# Patient Record
Sex: FEMALE | Race: BLACK OR AFRICAN AMERICAN | NOT HISPANIC OR LATINO | Employment: OTHER | URBAN - METROPOLITAN AREA
[De-identification: names, ages, dates, MRNs, and addresses within clinical notes are randomized per-mention and may not be internally consistent; named-entity substitution may affect disease eponyms.]

---

## 2022-04-11 ENCOUNTER — HOSPITAL ENCOUNTER (EMERGENCY)
Facility: OTHER | Age: 67
Discharge: HOME | End: 2022-04-11
Attending: EMERGENCY MEDICINE
Payer: MEDICARE

## 2022-04-11 VITALS
TEMPERATURE: 98 F | WEIGHT: 220 LBS | DIASTOLIC BLOOD PRESSURE: 63 MMHG | HEIGHT: 66 IN | HEART RATE: 49 BPM | RESPIRATION RATE: 17 BRPM | BODY MASS INDEX: 35.36 KG/M2 | SYSTOLIC BLOOD PRESSURE: 133 MMHG | OXYGEN SATURATION: 100 %

## 2022-04-11 DIAGNOSIS — M15.9 OSTEOARTHRITIS OF MULTIPLE JOINTS, UNSPECIFIED OSTEOARTHRITIS TYPE: Primary | ICD-10-CM

## 2022-04-11 PROCEDURE — 99283 EMERGENCY DEPT VISIT LOW MDM: CPT

## 2022-04-11 RX ORDER — DICLOFENAC SODIUM 10 MG/G
2 GEL TOPICAL DAILY
Qty: 20 G | Refills: 0 | Status: SHIPPED | OUTPATIENT
Start: 2022-04-11 | End: 2023-06-16

## 2022-04-12 NOTE — ED PROVIDER NOTES
Encounter Date: 4/11/2022       History     Chief Complaint   Patient presents with    Knee Pain     Right knee pain and right foot pain. Daughter states the patient walked more than usual yesterday when she got lost walking around. Nadn in triage.      65 y/o female presents with bilateral knee pain. Pt states pain started yesterday and describes it as an achy pain. Pt normally rides her bike daily but walked yesterday and that's when the knee started hurting. Pt has not tried any treatment for symptoms. Nothing specifically alleviates or worsens pain. This is a new occurrence although reports that she has similar ones with extended use in the past. Pt denies warmth, swelling, or erythema to the joints. Denies fever, chills, CP, or SOB. Pt is also having pain on her 5th toe.  Denies any numbness, tingling or inability to bear weight.    The history is provided by the patient and a relative.     Review of patient's allergies indicates:  No Known Allergies  No past medical history on file.  No past surgical history on file.  No family history on file.     Review of Systems   Constitutional: Negative for activity change, appetite change, chills and fever.   Respiratory: Negative for shortness of breath.    Cardiovascular: Negative for chest pain.   Gastrointestinal: Negative for constipation, diarrhea, nausea and vomiting.   Musculoskeletal: Positive for arthralgias and joint swelling. Negative for back pain and gait problem.   Skin: Negative for color change, rash and wound.   Neurological: Negative for dizziness, weakness, light-headedness and numbness.       Physical Exam     Initial Vitals [04/11/22 1805]   BP Pulse Resp Temp SpO2   133/63 (!) 49 17 97.9 °F (36.6 °C) 100 %      MAP       --         Physical Exam    Nursing note and vitals reviewed.  Constitutional: She appears well-developed and well-nourished. No distress.   Cardiovascular: Normal rate.   Pulmonary/Chest: No respiratory distress.    Musculoskeletal:         General: No tenderness. Normal range of motion.      Right upper leg: No tenderness.      Left upper leg: No tenderness.      Right knee: Effusion (slight) and crepitus present. No swelling or bony tenderness. Normal range of motion. No patellar tendon tenderness. No LCL laxity, MCL laxity, ACL laxity or PCL laxity.      Instability Tests: Anterior drawer test negative. Posterior drawer test negative. Medial Franny test negative and lateral Franny test negative.      Left knee: Effusion (slight) and crepitus present. No swelling or bony tenderness. Normal range of motion. No patellar tendon tenderness. No LCL laxity, MCL laxity, ACL laxity or PCL laxity.     Instability Tests: Anterior drawer test negative. Posterior drawer test negative. Medial Franny test negative and lateral Franny test negative.        Legs:         Feet:       Comments: 5/5 graded strength bilaterally for knee extension/flexion. Normal ROM for ankle and toe mobility.  Crepitus of the right affected knee with passive range of motion.  Chronic or sore throat a changes noted to bilateral knees along the medial joint line.  No laxity appreciated.  No acute bony process noted.     Neurological: She is alert and oriented to person, place, and time. She has normal strength. No sensory deficit. GCS score is 15. GCS eye subscore is 4. GCS verbal subscore is 5. GCS motor subscore is 6.         ED Course   Procedures  Labs Reviewed - No data to display       Imaging Results    None          Medications - No data to display    Tyra Zavala 66 y.o. presented to the ED with c/o  positive for knee pain.  Physical exam reveals patient well appearing in no  Distress. 5/5 graded strength bilaterally for knee extension/flexion. Normal ROM for ankle and toe mobility.  Crepitus of the right affected knee with passive range of motion.  Chronic or sore throat a changes noted to bilateral knees along the medial joint line.  No  laxity appreciated.  No acute bony process noted.  Neurovascularly intact.    DDX: fracture, sprain, dislocation, osteoarthritis, tendinitis, ill-fitting shoes, falling arches, pes planus, tendinitis, tinea    ED management:  Reassured patient and are at the bedside that overall symptoms were consistent with osteoarthritis.  Low suspicion of acute bony process or acute vascular etiology.  Discuss continued nonweightbearing activity and symptomatic care when some occurrence of pain.  She will be given referral to podiatrist as she is a diabetic and is not currently follow with 1.  No findings to suggest acute infectious or bony process of the toe as well.      Impression/Plan: The encounter diagnosis was Osteoarthritis of multiple joints, unspecified osteoarthritis type.  Patient will follow up with Primary or orthopedic.  Patient cautioned on when to return to ED.  Pt. Understands and agrees with current treatment plan                        Clinical Impression:   Final diagnoses:  [M15.9] Osteoarthritis of multiple joints, unspecified osteoarthritis type (Primary)          ED Disposition Condition    Discharge Stable        ED Prescriptions     Medication Sig Dispense Start Date End Date Auth. Provider    diclofenac sodium (VOLTAREN) 1 % Gel Apply 2 g topically once daily. 20 g 4/11/2022  PHIL Villatoro        Follow-up Information     Follow up With Specialties Details Why Contact Info    [facility] Schedule an appointment as soon as possible for a visit   [address] [place] [id number] [phone]    [facility]  Schedule an appointment as soon as possible for a visit              PHIL Villatoro  04/12/22 2057

## 2022-04-28 ENCOUNTER — TELEPHONE (OUTPATIENT)
Dept: ORTHOPEDICS | Facility: CLINIC | Age: 67
End: 2022-04-28
Payer: MEDICARE

## 2022-04-28 NOTE — TELEPHONE ENCOUNTER
Spoke with pt's daughter. Pt's daughter states she will not be able to bring pt to her appt tomorrow due to having to work. Will call back to schedule. All questions answered. Pt verbalized understanding.

## 2023-03-21 ENCOUNTER — TELEPHONE (OUTPATIENT)
Dept: NEUROLOGY | Facility: CLINIC | Age: 68
End: 2023-03-21
Payer: MEDICARE

## 2023-03-21 NOTE — TELEPHONE ENCOUNTER
----- Message from Nikki Schaefer sent at 3/21/2023  9:23 AM CDT -----  Regarding: Appt  Contact: [phone]  Pt's daughter Eva requesting appt for the following memory disorder. Pt has become really aggressive due to the fact she believes she still work, and havent been able to remember things. Pt also has went missing for 2 days and found in graveyard. Dr. Mckeon has been recommended for pt. Please call and adv @  [phone]

## 2023-05-05 ENCOUNTER — LAB VISIT (OUTPATIENT)
Dept: LAB | Facility: HOSPITAL | Age: 68
End: 2023-05-05
Attending: PSYCHIATRY & NEUROLOGY
Payer: MEDICARE

## 2023-05-05 ENCOUNTER — OFFICE VISIT (OUTPATIENT)
Dept: NEUROLOGY | Facility: CLINIC | Age: 68
End: 2023-05-05
Payer: MEDICARE

## 2023-05-05 VITALS
SYSTOLIC BLOOD PRESSURE: 132 MMHG | DIASTOLIC BLOOD PRESSURE: 72 MMHG | BODY MASS INDEX: 29.7 KG/M2 | WEIGHT: 173.94 LBS | HEART RATE: 48 BPM | HEIGHT: 64 IN

## 2023-05-05 DIAGNOSIS — F02.C11 SEVERE LATE ONSET ALZHEIMER'S DEMENTIA WITH AGITATION: Primary | ICD-10-CM

## 2023-05-05 DIAGNOSIS — R41.3 OTHER AMNESIA: ICD-10-CM

## 2023-05-05 DIAGNOSIS — R45.1 AGITATION: ICD-10-CM

## 2023-05-05 DIAGNOSIS — G30.1 SEVERE LATE ONSET ALZHEIMER'S DEMENTIA WITH AGITATION: Primary | ICD-10-CM

## 2023-05-05 DIAGNOSIS — N18.30 STAGE 3 CHRONIC KIDNEY DISEASE, UNSPECIFIED WHETHER STAGE 3A OR 3B CKD: ICD-10-CM

## 2023-05-05 LAB — TSH SERPL DL<=0.005 MIU/L-ACNC: 1.66 UIU/ML (ref 0.4–4)

## 2023-05-05 PROCEDURE — 3008F BODY MASS INDEX DOCD: CPT | Mod: CPTII,S$GLB,, | Performed by: PSYCHIATRY & NEUROLOGY

## 2023-05-05 PROCEDURE — 4010F ACE/ARB THERAPY RXD/TAKEN: CPT | Mod: CPTII,S$GLB,, | Performed by: PSYCHIATRY & NEUROLOGY

## 2023-05-05 PROCEDURE — 3078F DIAST BP <80 MM HG: CPT | Mod: CPTII,S$GLB,, | Performed by: PSYCHIATRY & NEUROLOGY

## 2023-05-05 PROCEDURE — 99999 PR PBB SHADOW E&M-EST. PATIENT-LVL III: ICD-10-PCS | Mod: PBBFAC,,, | Performed by: PSYCHIATRY & NEUROLOGY

## 2023-05-05 PROCEDURE — 1125F AMNT PAIN NOTED PAIN PRSNT: CPT | Mod: CPTII,S$GLB,, | Performed by: PSYCHIATRY & NEUROLOGY

## 2023-05-05 PROCEDURE — 1101F PR PT FALLS ASSESS DOC 0-1 FALLS W/OUT INJ PAST YR: ICD-10-PCS | Mod: CPTII,S$GLB,, | Performed by: PSYCHIATRY & NEUROLOGY

## 2023-05-05 PROCEDURE — 99999 PR PBB SHADOW E&M-EST. PATIENT-LVL III: CPT | Mod: PBBFAC,,, | Performed by: PSYCHIATRY & NEUROLOGY

## 2023-05-05 PROCEDURE — 1160F PR REVIEW ALL MEDS BY PRESCRIBER/CLIN PHARMACIST DOCUMENTED: ICD-10-PCS | Mod: CPTII,S$GLB,, | Performed by: PSYCHIATRY & NEUROLOGY

## 2023-05-05 PROCEDURE — 1160F RVW MEDS BY RX/DR IN RCRD: CPT | Mod: CPTII,S$GLB,, | Performed by: PSYCHIATRY & NEUROLOGY

## 2023-05-05 PROCEDURE — 84443 ASSAY THYROID STIM HORMONE: CPT | Performed by: PSYCHIATRY & NEUROLOGY

## 2023-05-05 PROCEDURE — 82746 ASSAY OF FOLIC ACID SERUM: CPT | Performed by: PSYCHIATRY & NEUROLOGY

## 2023-05-05 PROCEDURE — 1159F MED LIST DOCD IN RCRD: CPT | Mod: CPTII,S$GLB,, | Performed by: PSYCHIATRY & NEUROLOGY

## 2023-05-05 PROCEDURE — 3288F PR FALLS RISK ASSESSMENT DOCUMENTED: ICD-10-PCS | Mod: CPTII,S$GLB,, | Performed by: PSYCHIATRY & NEUROLOGY

## 2023-05-05 PROCEDURE — 3075F SYST BP GE 130 - 139MM HG: CPT | Mod: CPTII,S$GLB,, | Performed by: PSYCHIATRY & NEUROLOGY

## 2023-05-05 PROCEDURE — 3075F PR MOST RECENT SYSTOLIC BLOOD PRESS GE 130-139MM HG: ICD-10-PCS | Mod: CPTII,S$GLB,, | Performed by: PSYCHIATRY & NEUROLOGY

## 2023-05-05 PROCEDURE — 3288F FALL RISK ASSESSMENT DOCD: CPT | Mod: CPTII,S$GLB,, | Performed by: PSYCHIATRY & NEUROLOGY

## 2023-05-05 PROCEDURE — 82607 VITAMIN B-12: CPT | Performed by: PSYCHIATRY & NEUROLOGY

## 2023-05-05 PROCEDURE — 99205 PR OFFICE/OUTPT VISIT, NEW, LEVL V, 60-74 MIN: ICD-10-PCS | Mod: S$GLB,,, | Performed by: PSYCHIATRY & NEUROLOGY

## 2023-05-05 PROCEDURE — 1159F PR MEDICATION LIST DOCUMENTED IN MEDICAL RECORD: ICD-10-PCS | Mod: CPTII,S$GLB,, | Performed by: PSYCHIATRY & NEUROLOGY

## 2023-05-05 PROCEDURE — 1101F PT FALLS ASSESS-DOCD LE1/YR: CPT | Mod: CPTII,S$GLB,, | Performed by: PSYCHIATRY & NEUROLOGY

## 2023-05-05 PROCEDURE — 36415 COLL VENOUS BLD VENIPUNCTURE: CPT | Performed by: PSYCHIATRY & NEUROLOGY

## 2023-05-05 PROCEDURE — 4010F PR ACE/ARB THEARPY RXD/TAKEN: ICD-10-PCS | Mod: CPTII,S$GLB,, | Performed by: PSYCHIATRY & NEUROLOGY

## 2023-05-05 PROCEDURE — 99205 OFFICE O/P NEW HI 60 MIN: CPT | Mod: S$GLB,,, | Performed by: PSYCHIATRY & NEUROLOGY

## 2023-05-05 PROCEDURE — 1125F PR PAIN SEVERITY QUANTIFIED, PAIN PRESENT: ICD-10-PCS | Mod: CPTII,S$GLB,, | Performed by: PSYCHIATRY & NEUROLOGY

## 2023-05-05 PROCEDURE — 3008F PR BODY MASS INDEX (BMI) DOCUMENTED: ICD-10-PCS | Mod: CPTII,S$GLB,, | Performed by: PSYCHIATRY & NEUROLOGY

## 2023-05-05 PROCEDURE — 3078F PR MOST RECENT DIASTOLIC BLOOD PRESSURE < 80 MM HG: ICD-10-PCS | Mod: CPTII,S$GLB,, | Performed by: PSYCHIATRY & NEUROLOGY

## 2023-05-05 RX ORDER — MEMANTINE HYDROCHLORIDE 5 MG/1
TABLET ORAL
Qty: 45 TABLET | Refills: 0 | Status: SHIPPED | OUTPATIENT
Start: 2023-05-05 | End: 2023-06-04

## 2023-05-05 RX ORDER — MEMANTINE HYDROCHLORIDE 5 MG/1
TABLET ORAL
Qty: 45 TABLET | Refills: 0 | Status: SHIPPED | OUTPATIENT
Start: 2023-05-05 | End: 2023-05-05

## 2023-05-05 RX ORDER — MEMANTINE HYDROCHLORIDE 5 MG/1
5 TABLET ORAL 2 TIMES DAILY
Qty: 60 TABLET | Refills: 3 | OUTPATIENT
Start: 2023-05-05 | End: 2023-10-21

## 2023-05-05 RX ORDER — MEMANTINE HYDROCHLORIDE 10 MG/1
10 TABLET ORAL 2 TIMES DAILY
Qty: 60 TABLET | Refills: 3 | Status: SHIPPED | OUTPATIENT
Start: 2023-05-05 | End: 2023-05-05 | Stop reason: CLARIF

## 2023-05-05 RX ORDER — LORAZEPAM 0.5 MG/1
TABLET ORAL
Qty: 15 TABLET | Refills: 0 | Status: SHIPPED | OUTPATIENT
Start: 2023-05-05 | End: 2023-05-26 | Stop reason: SDUPTHER

## 2023-05-05 NOTE — PROGRESS NOTES
"Subjective:       Patient ID: Tyra Zavala is a 67 y.o. female.    Chief Complaint: Memory Loss and Swelling (Left knee swelling)      Ms. Zavala is a 67-year-old  woman who presents with her sister for further management of dementia.  Her sister states she is never been to see a neurologist.  The patient is very poor insight into her cognitive deficits.  She states that she feels great and basically does not have a problem except for "sometimes".      She is been to the ER recently with dehydration after wandering.  If had to call the police and follow missing person's report for her.      The problem started 3 or 4 years ago.  At that time she had to retire as a  because of her memory and cognitive issues.  She did not have a stroke or head injury and was not a heavy drinker.  The problems began gradually.  At this point she lives with her son in 2 days a week she goes to stay with her sister.  There is also a sitter who is with her now x1 month.  However is recently as 1 week ago she managed to walk out of the house.  Unfortunately she will not keep her phone on her and sometimes she does not even wear both shoes.      She states that she rides her bike every day but her sister states that this is inaccurate.      She watches television and does light housework.  She quit driving 2-1/2 years ago because she was getting lost and even drove in the wrong direction down the highway.  Her son handles the bills.  She is unable to cook for herself.      Risk factors for dementia include family history; her mother had dementia age of onset in her late 70s and lived for 10 years, and had become bed-bound after 5 years.      She gets NC in the afternoon around 5:00 a.m. when she is waiting to be picked up.  Sometimes she paces the floor level.  One time she could not be consoled when she got agitated but in general she is not a threat to herself or others and she is not ever physical or " hostile to others.           ER visit  Hospital Course:  67-year-old female admitted to the hospital medicine service for acute kidney injury secondary to volume depletion after being returned to family after having wander off from home 1 day prior (patient has underlying dementia). Treated with intravenous fluids with improvement in renal function. Patient discharged back home with family.    See below for complete problem list and detailed hospital course:    Acute kidney injury/Chronic kidney disease, stage 3: Diagnosis of chronic kidney disease based on review of medical record revealing baseline creatinine of approximately 2.0 mg/dL. Secondary to diabetic/hypertensive nephropathy. On presentation, patient noted to have decreased renal function from this baseline suggesting acute kidney injury. Acute kidney injury likely related to volume depletion. Treated with intravenous fluids with improvement in renal function. Anticipate renal function will continue to improve with continued adequate hydration. No acute indication for renal replacement therapy during this hospital stay.    Anemia, normocytic, chronic kidney disease: Diagnosis based on lab work. Secondary to chronic kidney disease. Baseline hemoglobin appears to be approximately 10-11 g/dL. Hemoglobin at baseline. Patient asymptomatic as expected with this mild, baseline anemia. No acute indication for transfusion at this time.    Hypernatremia, hypovolemic: Diagnosis based on lab work. Secondary to volume depletion. As expected, resolved with adequate hydration     Hyperkalemia: Diagnosis based on lab work. Likely related to volume depletion, acute kidney injury. As expected, resolved with adequate treatment of underlying issues as above.     Metabolic acidosis, normal anion gap: Diagnosis based on patient history. Secondary to volume depletion, acute kidney injury uremia. As expected, improved with adequate hydration and improvement in renal  function.    Elevated troponin/Myocardial injury, chronic: Diagnosis based on lab work. Likely secondary to decreased renal clearance in the setting of renal dysfunction. No significant concern for acute coronary syndrome. No need for further ischemic workup at this time.    Dementia: Diagnosis based on review of medical record. Appears to have fairly advanced dementia given that patient wandered off from home. No obvious acute issues at present.       Past Medical History:   Diagnosis Date    Hypertension       Past Surgical History:   Procedure Laterality Date    APPENDECTOMY          Current Outpatient Medications:     acetaminophen (TYLENOL) 500 MG tablet, Take 2 tablets (1,000 mg total) by mouth every 8 (eight) hours as needed for Pain., Disp: 60 tablet, Rfl: 0    atorvastatin (LIPITOR) 20 MG tablet, , Disp: , Rfl:     diclofenac sodium (VOLTAREN) 1 % Gel, Apply 2 g topically once daily., Disp: 20 g, Rfl: 0    hydroCHLOROthiazide (HYDRODIURIL) 25 MG tablet, Take 25 mg by mouth., Disp: , Rfl:     LIDOcaine (LIDODERM) 5 %, Place 1 patch onto the skin once daily. Remove & Discard patch within 12 hours or as directed by MD, Disp: 30 patch, Rfl: 0    LORazepam (ATIVAN) 0.5 MG tablet, One po q 8 hr prn agitation or insomnia, Disp: 15 tablet, Rfl: 0    memantine (NAMENDA) 10 MG Tab, Take 1 tablet (10 mg total) by mouth 2 (two) times daily., Disp: 60 tablet, Rfl: 3    memantine (NAMENDA) 5 MG Tab, Take 1 tablet (5 mg total) by mouth once daily for 15 days, THEN 1 tablet (5 mg total) 2 (two) times daily for 15 days., Disp: 45 tablet, Rfl: 0  No current facility-administered medications for this visit.   Review of patient's allergies indicates:  No Known Allergies     Review of Systems   Constitutional:  Negative for appetite change and unexpected weight change.   HENT:  Negative for trouble swallowing.    Neurological:  Negative for seizures, speech difficulty and headaches.   Psychiatric/Behavioral:  Positive for  "agitation, confusion and hallucinations (sister says she does say she sees ppl who are not there.). Negative for dysphoric mood and sleep disturbance. The patient is not nervous/anxious.          Objective:      Physical Exam  Constitutional:       Appearance: She is not ill-appearing.   Neurological:      Mental Status: She is alert.      Cranial Nerves: Cranial nerves 2-12 are intact. No dysarthria.      Motor: Abnormal muscle tone (mild increased tone of legs) present. No tremor.      Gait: Gait abnormal (antalgic).      Deep Tendon Reflexes: Reflexes normal.      Comments: Knows she is in a 'place where you try to help me"    Does not know the year or season    Language fluent but with word finding difficulty     Psychiatric:         Mood and Affect: Mood normal.         Behavior: Behavior normal.      Comments: Quiet demeanor and only speaks when spoken to.     Poor insight         Assessment:       1. Severe late onset Alzheimer's dementia with agitation    2. Agitation    3. Stage 3 chronic kidney disease, unspecified whether stage 3a or 3b CKD        Plan:            Probable Alzheimer's disease, advanced and with prominent problem of wandering.    Plan MRI brain    I recommended that her sister call the [employer] for tips on how to prevent the patient from leaving the home and also for caregiver advise and support and asked to have her son attend the f/u visit.     I did specifically recommended they put an alarm on the door, a GPS in her shoe, label her clothes with her name, and provide a  ID tag with ph#.    I believe she needs 24 hour supervision or nursing home placement.    I recommended comforting music in the afternoons around 4:00 p.m..  If she is ever frankly agitated they can try a dose of Ativan.    Add memantine.    Patient is noted to have an antalgic gait and complains of knee pain.  GFR 19-25 on 2 most recent checks; patient advised no NSAIDs unless cleared by PCP.  "   She is not currently seeing a primary care and so referral was placed.            Sherry Munson MD   05/05/2023   10:06 AM

## 2023-05-05 NOTE — PATIENT INSTRUCTIONS
Contact [employer] [place]    Help line     Caregiver's [phone]      Give her one ativan one hour before the MRI

## 2023-05-06 LAB
FOLATE SERPL-MCNC: 9.3 NG/ML (ref 4–24)
VIT B12 SERPL-MCNC: 241 PG/ML (ref 210–950)

## 2023-05-08 ENCOUNTER — TELEPHONE (OUTPATIENT)
Dept: NEUROLOGY | Facility: CLINIC | Age: 68
End: 2023-05-08
Payer: MEDICARE

## 2023-05-08 NOTE — TELEPHONE ENCOUNTER
----- Message from Sherry Munson MD sent at 5/8/2023  4:46 PM CDT -----  Pls call pt's sister and instruct to start giving one mg of B12 daily. Over the counter. TY

## 2023-05-08 NOTE — TELEPHONE ENCOUNTER
Called and spoke to patients sister about b-12 blood work results. Patients sister voiced understanding.

## 2023-05-16 ENCOUNTER — TELEPHONE (OUTPATIENT)
Dept: NEUROLOGY | Facility: CLINIC | Age: 68
End: 2023-05-16
Payer: MEDICARE

## 2023-05-16 NOTE — TELEPHONE ENCOUNTER
----- Message from Sherry Munson MD sent at 5/16/2023  4:12 PM CDT -----  Pls inform her family member that the MRI is typical of alzheimers, though an MRI is not diagnostic by itself. There is no evidence of anything unexpected on the MRI, like a stroke or tumor.

## 2023-05-17 ENCOUNTER — OUTPATIENT CASE MANAGEMENT (OUTPATIENT)
Dept: NEUROLOGY | Facility: CLINIC | Age: 68
End: 2023-05-17
Payer: MEDICARE

## 2023-05-17 NOTE — PROGRESS NOTES
MECHELLE (with Samria Pretty) consented CG/daughter, Eva, today to participate in the [employer] study and program.  Pt is unable to consent herself and Eva is pt's designated healthcare decision-maker.  Eva and her siblings/extended family share in the care of the pt. They are seeking help with sitters/nursing home placement.  CG said pt has Medicaid and will bring her Medicaid card to be scanned into pt's file during next appt, this Friday. Baseline questionnaires are scheduled for 5/22/23.

## 2023-05-17 NOTE — PROGRESS NOTES
Subject was consented today (05/17/2023) for the following study:     Study title: [employer]  IRB #: [id number]  IRB approval date: 12/12/2022  Sponsor: [employer]    Screening of inclusion/exclusion criteria evaluation has been reviewed by Samira Pretty. At this time, the subject meets all inclusion and does not meet any one of the exclusion criteria.       INFORMED CONSENT PROCESS/ INVOLVEMENT IN CARE/ PROXY   Present for discussion: Samira Melo Regina May   Does subject have capacity to consent per evaluation: No  Is LAR Consenting for Subject: Yes      LAR Determined by: Power of    If applicable, next of kin: Adult Child    Homebound Subjects: LAR present if subject is homebound and does not have capacity (capacity then determined by chart review and interview with LAR). Will review with subject via phone after if possible.       NOTES ON SIGNING ICF/INVOLVEMENT IN CARE/PROXY  [Capacity is determined per chart review, interview with LAR and patient, along with taking into consideration past practices]    Subject does not have capacity -  POA  POA signs forms, subject signs as well if able    CONSENT:  Verbal Consent: yes  Written Consent: no     Prior to the Informed Consent (IC) being signed, or any protocol required testing, procedure, or intervention being performed, the following was done or discussed:    Purpose of the Study, Qualifications to Participate: YES/NO: Yes  Study Design, Schedule and Procedures: YES/NO: Yes  Risks, Benefits, Alternative Treatments, Compensation and Costs: YES/NO: Yes  Confidentiality and HIPAA Authorization for Release of Medical Records for the research trial/subject's right/study related injury: YES/NO: Yes  Study related contact information: YES/NO: Yes  Voluntary Participation and Withdrawal from the research trial at any time: YES/NO: Yes  Optional samples/procedures (if applicable): Not applicable.  Patient has been offered the opportunity to  ask questions regarding the study and all questions were answered satisfactorily: YES/NO: Yes  Patient and/or LAR verbalizes understanding of the study/procedures and agrees to participate: YES/NO: Yes  CRC and PI contact information given to LAR and/or patient: No - does not apply. Verbal consent documented in [employer].   Signed copy given to patient and/or LAR: No - does not apply. Participant Information sheet sent via e-mail  Copy in patient's chart and original uploaded to Epic: No - documented in [employer]        Person Obtaining Consent: Samira Pretty  Witness: Padmaja Jose LCSW  Subject Study ID: [id number]

## 2023-05-19 ENCOUNTER — TELEPHONE (OUTPATIENT)
Dept: NEUROLOGY | Facility: CLINIC | Age: 68
End: 2023-05-19
Payer: MEDICARE

## 2023-05-19 PROBLEM — E11.22 CKD STAGE 3 DUE TO TYPE 2 DIABETES MELLITUS: Status: ACTIVE | Noted: 2023-05-19

## 2023-05-19 PROBLEM — N18.30 CKD STAGE 3 DUE TO TYPE 2 DIABETES MELLITUS: Status: ACTIVE | Noted: 2023-05-19

## 2023-05-23 ENCOUNTER — OUTPATIENT CASE MANAGEMENT (OUTPATIENT)
Dept: NEUROLOGY | Facility: CLINIC | Age: 68
End: 2023-05-23
Payer: MEDICARE

## 2023-05-23 NOTE — PROGRESS NOTES
SW completed baseline questionnaires with CG/daughter, Eva.  SW sent email with [employer] links to ID bracelet, waterproof bedpads, and tracking tiles.  Care plan call is scheduled for 6/5/23.

## 2023-05-26 ENCOUNTER — OFFICE VISIT (OUTPATIENT)
Dept: PRIMARY CARE CLINIC | Facility: CLINIC | Age: 68
End: 2023-05-26
Payer: MEDICARE

## 2023-05-26 VITALS
HEART RATE: 55 BPM | RESPIRATION RATE: 18 BRPM | SYSTOLIC BLOOD PRESSURE: 136 MMHG | DIASTOLIC BLOOD PRESSURE: 78 MMHG | WEIGHT: 188.81 LBS | BODY MASS INDEX: 32.23 KG/M2 | TEMPERATURE: 98 F | OXYGEN SATURATION: 100 % | HEIGHT: 64 IN

## 2023-05-26 DIAGNOSIS — Z11.4 ENCOUNTER FOR SCREENING FOR HIV: ICD-10-CM

## 2023-05-26 DIAGNOSIS — Z13.6 ENCOUNTER FOR SCREENING FOR CARDIOVASCULAR DISORDERS: ICD-10-CM

## 2023-05-26 DIAGNOSIS — Z51.81 MEDICATION MONITORING ENCOUNTER: ICD-10-CM

## 2023-05-26 DIAGNOSIS — G30.9 SEVERE ALZHEIMER'S DEMENTIA WITH AGITATION, UNSPECIFIED TIMING OF DEMENTIA ONSET: ICD-10-CM

## 2023-05-26 DIAGNOSIS — Z11.59 NEED FOR HEPATITIS C SCREENING TEST: ICD-10-CM

## 2023-05-26 DIAGNOSIS — Z76.89 ENCOUNTER TO ESTABLISH CARE: Primary | ICD-10-CM

## 2023-05-26 DIAGNOSIS — Z12.31 BREAST CANCER SCREENING BY MAMMOGRAM: ICD-10-CM

## 2023-05-26 DIAGNOSIS — Z78.0 ASYMPTOMATIC POSTMENOPAUSAL STATE: ICD-10-CM

## 2023-05-26 DIAGNOSIS — N18.30 STAGE 3 CHRONIC KIDNEY DISEASE, UNSPECIFIED WHETHER STAGE 3A OR 3B CKD: ICD-10-CM

## 2023-05-26 DIAGNOSIS — F02.C11 SEVERE ALZHEIMER'S DEMENTIA WITH AGITATION, UNSPECIFIED TIMING OF DEMENTIA ONSET: ICD-10-CM

## 2023-05-26 PROCEDURE — 1159F PR MEDICATION LIST DOCUMENTED IN MEDICAL RECORD: ICD-10-PCS | Mod: CPTII,S$GLB,, | Performed by: STUDENT IN AN ORGANIZED HEALTH CARE EDUCATION/TRAINING PROGRAM

## 2023-05-26 PROCEDURE — 99214 PR OFFICE/OUTPT VISIT, EST, LEVL IV, 30-39 MIN: ICD-10-PCS | Mod: S$GLB,,, | Performed by: STUDENT IN AN ORGANIZED HEALTH CARE EDUCATION/TRAINING PROGRAM

## 2023-05-26 PROCEDURE — 3075F SYST BP GE 130 - 139MM HG: CPT | Mod: CPTII,S$GLB,, | Performed by: STUDENT IN AN ORGANIZED HEALTH CARE EDUCATION/TRAINING PROGRAM

## 2023-05-26 PROCEDURE — 1159F MED LIST DOCD IN RCRD: CPT | Mod: CPTII,S$GLB,, | Performed by: STUDENT IN AN ORGANIZED HEALTH CARE EDUCATION/TRAINING PROGRAM

## 2023-05-26 PROCEDURE — 1101F PR PT FALLS ASSESS DOC 0-1 FALLS W/OUT INJ PAST YR: ICD-10-PCS | Mod: CPTII,S$GLB,, | Performed by: STUDENT IN AN ORGANIZED HEALTH CARE EDUCATION/TRAINING PROGRAM

## 2023-05-26 PROCEDURE — 3008F BODY MASS INDEX DOCD: CPT | Mod: CPTII,S$GLB,, | Performed by: STUDENT IN AN ORGANIZED HEALTH CARE EDUCATION/TRAINING PROGRAM

## 2023-05-26 PROCEDURE — 99999 PR PBB SHADOW E&M-EST. PATIENT-LVL V: CPT | Mod: PBBFAC,,, | Performed by: STUDENT IN AN ORGANIZED HEALTH CARE EDUCATION/TRAINING PROGRAM

## 2023-05-26 PROCEDURE — 99214 OFFICE O/P EST MOD 30 MIN: CPT | Mod: S$GLB,,, | Performed by: STUDENT IN AN ORGANIZED HEALTH CARE EDUCATION/TRAINING PROGRAM

## 2023-05-26 PROCEDURE — 4010F ACE/ARB THERAPY RXD/TAKEN: CPT | Mod: CPTII,S$GLB,, | Performed by: STUDENT IN AN ORGANIZED HEALTH CARE EDUCATION/TRAINING PROGRAM

## 2023-05-26 PROCEDURE — 99999 PR PBB SHADOW E&M-EST. PATIENT-LVL V: ICD-10-PCS | Mod: PBBFAC,,, | Performed by: STUDENT IN AN ORGANIZED HEALTH CARE EDUCATION/TRAINING PROGRAM

## 2023-05-26 PROCEDURE — 1160F RVW MEDS BY RX/DR IN RCRD: CPT | Mod: CPTII,S$GLB,, | Performed by: STUDENT IN AN ORGANIZED HEALTH CARE EDUCATION/TRAINING PROGRAM

## 2023-05-26 PROCEDURE — 3008F PR BODY MASS INDEX (BMI) DOCUMENTED: ICD-10-PCS | Mod: CPTII,S$GLB,, | Performed by: STUDENT IN AN ORGANIZED HEALTH CARE EDUCATION/TRAINING PROGRAM

## 2023-05-26 PROCEDURE — 1126F PR PAIN SEVERITY QUANTIFIED, NO PAIN PRESENT: ICD-10-PCS | Mod: CPTII,S$GLB,, | Performed by: STUDENT IN AN ORGANIZED HEALTH CARE EDUCATION/TRAINING PROGRAM

## 2023-05-26 PROCEDURE — 3288F PR FALLS RISK ASSESSMENT DOCUMENTED: ICD-10-PCS | Mod: CPTII,S$GLB,, | Performed by: STUDENT IN AN ORGANIZED HEALTH CARE EDUCATION/TRAINING PROGRAM

## 2023-05-26 PROCEDURE — 1126F AMNT PAIN NOTED NONE PRSNT: CPT | Mod: CPTII,S$GLB,, | Performed by: STUDENT IN AN ORGANIZED HEALTH CARE EDUCATION/TRAINING PROGRAM

## 2023-05-26 PROCEDURE — 3078F DIAST BP <80 MM HG: CPT | Mod: CPTII,S$GLB,, | Performed by: STUDENT IN AN ORGANIZED HEALTH CARE EDUCATION/TRAINING PROGRAM

## 2023-05-26 PROCEDURE — 1101F PT FALLS ASSESS-DOCD LE1/YR: CPT | Mod: CPTII,S$GLB,, | Performed by: STUDENT IN AN ORGANIZED HEALTH CARE EDUCATION/TRAINING PROGRAM

## 2023-05-26 PROCEDURE — 3075F PR MOST RECENT SYSTOLIC BLOOD PRESS GE 130-139MM HG: ICD-10-PCS | Mod: CPTII,S$GLB,, | Performed by: STUDENT IN AN ORGANIZED HEALTH CARE EDUCATION/TRAINING PROGRAM

## 2023-05-26 PROCEDURE — 4010F PR ACE/ARB THEARPY RXD/TAKEN: ICD-10-PCS | Mod: CPTII,S$GLB,, | Performed by: STUDENT IN AN ORGANIZED HEALTH CARE EDUCATION/TRAINING PROGRAM

## 2023-05-26 PROCEDURE — 1160F PR REVIEW ALL MEDS BY PRESCRIBER/CLIN PHARMACIST DOCUMENTED: ICD-10-PCS | Mod: CPTII,S$GLB,, | Performed by: STUDENT IN AN ORGANIZED HEALTH CARE EDUCATION/TRAINING PROGRAM

## 2023-05-26 PROCEDURE — 3078F PR MOST RECENT DIASTOLIC BLOOD PRESSURE < 80 MM HG: ICD-10-PCS | Mod: CPTII,S$GLB,, | Performed by: STUDENT IN AN ORGANIZED HEALTH CARE EDUCATION/TRAINING PROGRAM

## 2023-05-26 PROCEDURE — 3288F FALL RISK ASSESSMENT DOCD: CPT | Mod: CPTII,S$GLB,, | Performed by: STUDENT IN AN ORGANIZED HEALTH CARE EDUCATION/TRAINING PROGRAM

## 2023-05-26 RX ORDER — LORAZEPAM 0.5 MG/1
TABLET ORAL
Qty: 15 TABLET | Refills: 0 | OUTPATIENT
Start: 2023-05-26 | End: 2023-10-21

## 2023-05-26 NOTE — PROGRESS NOTES
"Subjective:       Patient ID: Tyra Zavala is a 67 y.o. female.    Chief Complaint: Establish Care      HPI:  67 y.o. female presents to [facility] to establish care    Acute concerns?: None today    Last PCP?: [facility]  Allergies: NKDA  Medical History: HTN, HLD, CKD stage 3, Alzheimer's dementia  Medications: acetaminophen 500 mg, atorvastatin 20 mg, diclofenac gel PRN, HCTZ 25 mg, lorazepam 0.5 mg q8hr PRN, namenda 5 mg bid  Surgical History: appendectomy, c/s x1  Family History: No known cancers, no known autoimmune disease  Social History: No smoking in past; no EtOH, no illicits    Fasting?: Yes  Hep C/HIV screening?: Amenable  Last tetanus vaccine?: Uncertain  Mammogram?: Hasn't had  Colonoscopy Hx?: Unknown if ever abnormal  DEXA scan?: Hasn't had    Review of Systems   Constitutional:  Negative for chills, diaphoresis, fatigue and fever.   HENT:  Negative for congestion, sinus pressure, sneezing and sore throat.    Respiratory:  Negative for cough and shortness of breath.    Cardiovascular:  Negative for chest pain and palpitations.   Gastrointestinal:  Negative for abdominal pain, diarrhea, nausea and vomiting.   Musculoskeletal:  Negative for arthralgias, joint swelling and myalgias.   Skin:  Negative for rash and wound.   Neurological:  Negative for dizziness, light-headedness and headaches.     Objective:      Vitals:    05/26/23 1009   BP: 136/78   BP Location: Left arm   Patient Position: Sitting   BP Method: Medium (Manual)   Pulse: (!) 55   Resp: 18   Temp: 98.1 °F (36.7 °C)   TempSrc: Temporal   SpO2: 100%   Weight: 85.6 kg (188 lb 13.2 oz)   Height: 5' 4" (1.626 m)     Physical Exam  Vitals reviewed.   Constitutional:       General: She is not in acute distress.     Appearance: Normal appearance. She is not ill-appearing.   HENT:      Head: Normocephalic and atraumatic.   Eyes:      General:         Right eye: No discharge.         Left eye: No discharge.      Conjunctiva/sclera: " Conjunctivae normal.   Neck:      Thyroid: No thyroid mass, thyromegaly or thyroid tenderness.   Cardiovascular:      Rate and Rhythm: Normal rate and regular rhythm.      Pulses: Normal pulses.      Heart sounds: No murmur heard.  Pulmonary:      Effort: Pulmonary effort is normal.      Breath sounds: Normal breath sounds.   Musculoskeletal:         General: No deformity.      Cervical back: Neck supple. No rigidity.   Lymphadenopathy:      Cervical: No cervical adenopathy.   Skin:     General: Skin is warm and dry.      Coloration: Skin is not jaundiced.   Neurological:      General: No focal deficit present.      Mental Status: She is alert and oriented to person, place, and time.   Psychiatric:         Mood and Affect: Mood normal.         Behavior: Behavior normal.           Lab Results   Component Value Date    CREATININE 1.8 (H) 12/10/2020     Lab Results   Component Value Date    HGBA1C 5.3 04/24/2023     No results found for: BNP, BNPTRIAGEBLO    No results found for: WBC, HGB, HCT, PLT, GRAN  No results found for: CHOL, HDL, LDLCALC, TRIG       Current Outpatient Medications:     acetaminophen (TYLENOL) 500 MG tablet, Take 2 tablets (1,000 mg total) by mouth every 8 (eight) hours as needed for Pain., Disp: 60 tablet, Rfl: 0    atorvastatin (LIPITOR) 20 MG tablet, , Disp: , Rfl:     diclofenac sodium (VOLTAREN) 1 % Gel, Apply 2 g topically once daily., Disp: 20 g, Rfl: 0    hydroCHLOROthiazide (HYDRODIURIL) 25 MG tablet, Take 25 mg by mouth., Disp: , Rfl:     LIDOcaine (LIDODERM) 5 %, Place 1 patch onto the skin once daily. Remove & Discard patch within 12 hours or as directed by MD, Disp: 30 patch, Rfl: 0    memantine (NAMENDA) 5 MG Tab, Take 1 tablet (5 mg total) by mouth once daily for 15 days, THEN 1 tablet (5 mg total) 2 (two) times daily for 15 days., Disp: 45 tablet, Rfl: 0    memantine (NAMENDA) 5 MG Tab, Take 1 tablet (5 mg total) by mouth 2 (two) times daily., Disp: 60 tablet, Rfl: 3    LORazepam  (ATIVAN) 0.5 MG tablet, One po q 8 hr prn agitation or insomnia. Not intended as daily medication. Do not drive (even bike) or perform dangerous tasks while taking this medication, Disp: 15 tablet, Rfl: 0        Assessment:       1. Encounter to establish care    2. Severe Alzheimer's dementia with agitation, unspecified timing of dementia onset    3. Stage 3 chronic kidney disease, unspecified whether stage 3a or 3b CKD    4. Medication monitoring encounter    5. Encounter for screening for cardiovascular disorders    6. Need for hepatitis C screening test    7. Encounter for screening for HIV    8. Breast cancer screening by mammogram    9. Asymptomatic postmenopausal state           Plan:       Encounter to establish care    Severe Alzheimer's dementia with agitation, unspecified timing of dementia onset  -     RPR; Future; Expected date: 05/26/2023  -     LORazepam (ATIVAN) 0.5 MG tablet; One po q 8 hr prn agitation or insomnia. Not intended as daily medication. Do not drive (even bike) or perform dangerous tasks while taking this medication  Dispense: 15 tablet; Refill: 0  - Will continue to follow-up with current neurologist    Stage 3 chronic kidney disease, unspecified whether stage 3a or 3b CKD  -     Ambulatory consult to Internal Medicine  - Keep f/u with Nephrology, avoiding nephrotoxic agents  - Will perform repeat testing today, will need to renally dose Namenda    Medication monitoring encounter  -     CBC Auto Differential; Future; Expected date: 05/26/2023  -     Comprehensive Metabolic Panel; Future; Expected date: 05/26/2023    Encounter for screening for cardiovascular disorders  -     Lipid Panel; Future; Expected date: 05/26/2023    Need for hepatitis C screening test  -     Hepatitis C Antibody; Future; Expected date: 05/26/2023    Encounter for screening for HIV  -     HIV 1/2 Ag/Ab (4th Gen); Future; Expected date: 05/26/2023    Breast cancer screening by mammogram  -     Mammo Digital  Screening Bilat; Future; Expected date: 05/26/2023    Asymptomatic postmenopausal state  -     DXA Bone Density Axial Skeleton 1 or more sites; Future; Expected date: 05/26/2023    RTC in  3 months

## 2023-06-05 ENCOUNTER — OUTPATIENT CASE MANAGEMENT (OUTPATIENT)
Dept: NEUROLOGY | Facility: CLINIC | Age: 68
End: 2023-06-05
Payer: MEDICARE

## 2023-06-05 NOTE — PROGRESS NOTES
"  Care [facility] Dementia Care Management Plan - BASELINE     Assigned Care Team Navigator: Padmaja Jose LCSW  Referring Provider: Dr. Attila Mckeon (chart review)  Primary Care: Jayesh Dolan MD     Protocol: The Care Ecosystem [facility] Effectiveness Study  Identifier: [id number]  IRB#: 2022.247  PI: Dr. Jero Key, PhD  CO-I: Dr. Bibi Heller, PsyD  Version Date: 2022  Pt Study ID: [id number]  Visit Month: M1    Timeline:   (*Note for CTN - complete timeline using completed or planned date for study events. Add any important events (i.e. Withdrawals, Lost to Follow Up, Adverse Events, etc.).  Consent: Completed(23)  Baseline questionnaires: Completed(23)  6-month questionnaires: Planned(2023)  12-month questionnaires: Planned(2024)    Patient Demographic Information     Name: Tyra Zavala  Preferred Name: Ms. Mary  MRN: [id number]  : 1955  Age: 67 y.o.  Gender: female  Race: Black or   Ethnicity: Not  or /a  Level of Education: Some college but no degree   Occupational Status/History: [employer]    Communication Preferences     Language: English   Please contact primary caregiver by Phone for scheduling purposes.   Please send information and forms via E-mail    Caregiver(s) and Social History     Family Status: Pt has 4 adult children, three live locally and all pitch in to help.   Current Living Situation: Other Family Family take turns rotating pt to their homes.  Family feels like this is "putting a major strain on everybody."   Support System: No other supprt system  Patient Hobbies/Activities: No  Patient Stressors (e.g., Pain): Pt complains of pain in left knee, vision is poor,  pt need glasses.  Functional motor skills like dressing are impaired.   Current Programs/Services: No       Caregiver Name  Role Relationship Main Contact #   Eva Zavala Primary Daughter [phone]                           Medical History     Providers "   (Relevant to dementia care) Dr. Attila Mckeon   Types of help wanted   (at baseline) Information about dementia and what to expect in the future, Ideas for coping with the stress of caregiving, Caregiving strategies for helping Test do as much as he/she still can, Ideas for managing behavior symptoms, Recreational or purposeful activity ideas, Advice about safety risks like falls, wandering, or poor judgment, Advice about medications, Information about caregiving support services like in-home care, adult day care, or care facilities, Information about programs that might help pay for caregiving services, Information about medical, legal, and/or financial planning, and Help with back-up or crisis planning   Concerns and Goals   (from caregiver and PWD) Pt rotates between 4 CG homes.  All Cgs are working full-time jobs and realize this is not a sustainable situation.  The goal is to settle on a plan that works for all Cgs as well as the pt.    Type of Dementia and Stage  (all that apply) Dementia Type: Alzheimer's Disease  Stage: Severe  MMSE/MOCA not on file      No flowsheet data found.       Medication Review (at baseline)   Medication reviewed with caregiver Eva and Pepe Taylor.   Information is based off patient chart and patient and/or caregiver self-report as of (6/6/23)  *Make note of any changes to current medication list.*    Current Outpatient Medications   Medication Instructions    acetaminophen (TYLENOL) 1,000 mg, Oral, Every 8 hours PRN    atorvastatin (LIPITOR) 20 MG tablet No dose, route, or frequency recorded.  ### not sure is she is currently taking ###    diclofenac sodium (VOLTAREN) 2 g, Topical (Top), Daily  ### not currently taking ###    hydroCHLOROthiazide (HYDRODIURIL) 25 mg, Oral ### not taking ###    LIDOcaine (LIDODERM) 5 % 1 patch, Transdermal, Daily, Remove & Discard patch within 12 hours or as directed by MD  ### not taking ###    LORazepam (ATIVAN) 0.5 MG tablet One po q 8 hr prn  "agitation or insomnia. Not intended as daily medication. Do not drive (even bike) or perform dangerous tasks while taking this medication    memantine (NAMENDA) 5 mg, Oral, 2 times daily          Over the counter medications: No  Vitamins, supplements: No  Caffeine, alcohol, tobacco, marijuana products: Coffee      THE FOLLOWING CONCERNS WERE IDENTIFIED:  Medication management: Yes - Since pt goes from house to house-CG is concerned that pt is not getting meds like she should.   Access/cost: No  Side effects: No  Recent changes: Yes - Ativan and Namenda were added  Polypharmacy (>9 routine prescription medications?): No  Behaviors/Sleep: Yes - Pt doesn't sleep through the night, is convinced that her family is trying to keep her from going to work.    Other symptoms (falls/incontinence/diarrhea/swelling/itching/weight loss): Yes - Pt does not know her limits of mobility.      Due to patient not having one "point person" for medical decisions and medical care coordination, Cgs were not sure of med list. SW is initiating a family meeting to assist family in care coordination.     PLAN:  CTN to send medication list to PharmD this week. Review will be addended by PharmD.   CTN will route pharmacist recommendations to Jayesh Dolan MD   CTN will share and discuss pharmacist recommendations with caregiver during next scheduled call.         Advanced Care Planning      Living Will: No:        Copy on chart: No  LaPOST: No:      Medical Power of : No:        Copy on chart: No   Financial Power of : No:        Copy on chart: No   Agent's Name: N/A       Goals of Care      Patient Values: [place], Reduced Pain, and family    Future Care Plans:   - Long term care goal:  Family disagrees about placement  - Resources available to pay for care: Insurance Coverage Public Benefits      Current Concerns - BASELINE     Primary Concern(s)  (Immediate needs) Getting family to agree on placement   Cognitive " Concerns  (Include decision making capacity) Memory is affecting day to day, sometimes pt feels like she is just finding out her mother passed away --this is traumatic for her family.  Pt doesn't recognize herself in the mirror   Primary Behavior Concerns  (Symptoms, triggers, strategies) Pt doesn't sleep well at night and often gets agitated in the evenings/.   Functional  (Include PWD daily routine and sensory - vision/hearing - information) Pt cannot dress herself, balance issues        LA-GWEP 5/23/2023   Memory and Recall Severe memory loss, almost impossible to recall new information, long-term memory may be affected   Orientation Only oriented to their name, although may recognize family members   Decision Making and Problem Solving Abilities Unable to make decisions or solve problems, others make nearly all decisions for patient   Activities Outside the Home No pretense of independent function outside the home, appears well enough to be taken to activities outside the family home but generally needs to be accompanied   Function at Home and Hobby Activities No meaningful function in household chores or with prior hobbies   Toileting and Personal Hygeine Requires significant help with personal care and hygiene, frequent incontinence   Behavior and Personality Changes Severe behavior or personality changes, making interactions with others often unpleasant or avoided   Language and Communication Abilities Moderate to severe impairments in speech production or comprehension, has difficulty communicating thoughts to others, limited ability to read or write   Mood Moderate issues with sadness, depression, anxiety, nervousness or loss of interest/motivation   Attention and Concentration Limited to no ability to pay attention to external environment or surroundings       NPIQ RFS 5/23/2023   WHO IS FILLING OUT FORM? Caregiver   Does this patient have false beliefs, such as thinking that others are stealing from  him/her or planning to harm him/her in some way? Yes   Delusions Severity 3   Delusion Distress 6   Does this patient have hallucinations such as false visions or voices? [place] she/he seem to hear or see things that are not present? Yes   Hallucination Severity 3   Hallucination Distress 2   Is the patient resistive to help from others at times, or hard to handle? Yes   Agitation Agression Severity 2   Agitation/Agression Distress 4   Does the patient seem sad or say that he/she is depressed? Yes   Depression/Dysphoria Severity 2   Depression/Dysphoria Distress 4   Does the patient become upset when  from you? Does he/she have any other signs of nervousness such as shortness of breath, sighing, being unable tor elax, or feeling excessively tense? Yes   Anxiety Severity 2   Anxiety Distress 4   Does the patient appear to feel good or act excessively happy? No   Does this patient seem less interested in his/her usual activities or in the activities and plans of others? No   Does this patient seem to act cumpolsively, for example, talking to strangers as if she/he knows them, or saying things that may hurt people's feelings? Yes   Dis-inhibition Severity 3   Dis-inhibition Distress 1   Is the patient impatient and cranky? Does he/she have difficulty coping with delays or waiting for planned activities? Yes   Irritability/Liability Severity 2   Irritability/Liability Distress 1   Does the patient engage in repetitive activities such as pacing around the house, handling buttons, wrapping string, or doing other things repeatedly? Yes   Motor Disturbance Severity 3   Motor Disturbance Distress 5   Does this patient awaken you during the night, rise too early in the morning, or take excessive naps during the day? Yes   Nightime Behavior Severity 3   Nightime Behavior Distress 6   Has the patient lost or gained weight, or had a change in the type of food he/she likes? Yes   Apetitie/Eating Severity 3   Apetite/Eating  Distress 5   NPI Total Severity Score 26   NPI Total Distress Score 38           CTN Plan & Recommendations     CTN provided the following resources/recommendations for:  MECHELLE recommended scheduling a PCP appt for pt to report any symptoms (chronic, severe knee pain, for example) and optimize medications. In addition, MECHELLE recommended a family meeting and sent CG SW available dates and times via email.  She will approach family and get back with SW regarding time and date.     Next steps: Send med list to PharmD for review and recs.

## 2023-06-12 NOTE — PROGRESS NOTES
MECHELLE met face to face with pt's two sons and with pt's daughter on speaker phone.  They agreed they would like to keep pt at home as indefinitely.  MECHELLE outlined [facility] long-term in home care for the family and sent the phone number to apply via email.  In addition, at the request of the family, MECHELLE will send a message to pt's PCP expressing their desire for HH with PT.  MECHELLE remains available.

## 2023-06-16 NOTE — PROGRESS NOTES
Care [facility]cosystem Medication Review - PharmD    [facility] patient. Medication review completed by Care Team Navigator (CTN) and Rain Berry PharmD to identify dementia specific medication concerns, as well as opportunities to reduce polypharmacy, high risk medications, and fall risk as needed.     Current Outpatient Medications on File Prior to Visit   Medication Sig    acetaminophen (TYLENOL) 500 MG tablet Take 2 tablets (1,000 mg total) by mouth every 8 (eight) hours as needed for Pain.    atorvastatin (LIPITOR) 20 MG tablet     hydroCHLOROthiazide (HYDRODIURIL) 25 MG tablet Take 25 mg by mouth.    LORazepam (ATIVAN) 0.5 MG tablet One po q 8 hr prn agitation or insomnia. Not intended as daily medication. Do not drive (even bike) or perform dangerous tasks while taking this medication    memantine (NAMENDA) 5 MG Tab Take 1 tablet (5 mg total) by mouth 2 (two) times daily.     PharmD Recommendations:    Didn't remove HCTZ or atorvastatin from list as they were both just filled on 5/30 for 90 day supplies.   Would not recommend using lorazepam for agitation as this will likely increase her confusion. A better option may be quetiapine.   Updated Epic med list to reflect CTN notes and fill history.    Time spent delivering care (in minutes): 10    Plan:  Pharmacist to route recommendations to CTN.

## 2023-06-26 ENCOUNTER — TELEPHONE (OUTPATIENT)
Dept: PRIMARY CARE CLINIC | Facility: CLINIC | Age: 68
End: 2023-06-26
Payer: MEDICARE

## 2023-07-06 ENCOUNTER — TELEPHONE (OUTPATIENT)
Dept: NEUROLOGY | Facility: CLINIC | Age: 68
End: 2023-07-06
Payer: MEDICARE

## 2023-07-06 NOTE — TELEPHONE ENCOUNTER
MECHELLE made second attempt to complete care eco monthly check in with Valerie Weeks.  MECHELLE will make 3rd attempt 7/11/23.

## 2023-07-11 ENCOUNTER — TELEPHONE (OUTPATIENT)
Dept: NEUROLOGY | Facility: CLINIC | Age: 68
End: 2023-07-11
Payer: MEDICARE

## 2023-07-11 NOTE — TELEPHONE ENCOUNTER
SW made second attempt to follow up for monthly care eco check in.  SW will make third attempt 7/13/23.

## 2023-07-13 ENCOUNTER — OUTPATIENT CASE MANAGEMENT (OUTPATIENT)
Dept: NEUROLOGY | Facility: CLINIC | Age: 68
End: 2023-07-13
Payer: MEDICARE

## 2023-07-13 NOTE — PROGRESS NOTES
Protocol: The Care [facility] Effectiveness Study  Identifier: [id number]  IRB#: 2022.247  PI: Dr. Jero Key, PhD  CO-I: Dr. Bibi Heller PsyD  Version Date: 12/05/2022  Pt Study ID: [id number]  Visit Month: M2     Timeline:   (*Note for CTN - complete timeline using completed or planned date for study events. Add any important events (i.e. Withdrawals, Lost to Follow Up, Adverse Events, etc.).  Consent: Completed(5/17/23)  Baseline questionnaires: Completed(5/17/23)  6-month questionnaires: Planned(11/2023)  12-month questionnaires: Planned(5/2024)     Visit Note:   Spoke with caregiver Eva (Other Family DIL ) for month 2 visit. Eva said she was at work and couldn't talk.      Falls in the past month: 0  UTIs in the past month: 0  Hospital encounters in the past month (reported by caregiver): 0      Next monthly visit scheduled for: 8/16/23. Caregiver knows how to reach CTN, and is encouraged to do so, as needed before our next scheduled call.     Action items for CTN: Continue to follow        ClinCard sent/loaded: no  (*Only applies to 6-month and 12-month visits)

## 2023-07-13 NOTE — PROGRESS NOTES
SW made third attempt to complete monthly care eco follow up.  LVM.  SW will follow up 8/16/23 and remains available.

## 2023-07-17 ENCOUNTER — TELEPHONE (OUTPATIENT)
Dept: NEUROLOGY | Facility: CLINIC | Age: 68
End: 2023-07-17
Payer: MEDICARE

## 2023-07-17 NOTE — TELEPHONE ENCOUNTER
Pt's daughter, Karina, called Willie and asked for list of day programs.  She said pt has Medicaid.  WILLIE sent list via email and encouraged daughter to get a copy of pt's Medicaid card on file with [facility]. She verbalized understanding and agreed.

## 2023-08-07 ENCOUNTER — TELEPHONE (OUTPATIENT)
Dept: PRIMARY CARE CLINIC | Facility: CLINIC | Age: 68
End: 2023-08-07
Payer: MEDICARE

## 2023-08-07 NOTE — TELEPHONE ENCOUNTER
"----- Message from Jayesh Dolan MD sent at 5/26/2023  3:08 PM CDT -----  Please notify patient that her cholesterol levels were elevated. I recommend regular exercise 4-5 days per week 30 minutes per day to help improve numbers. Decreased dietary fats and daily fish oil may also help improve this finding.    Renal function has slightly worsened and I recommend good fluid intake at this time and to avoid all NSAIDs such as ibuprofen and naproxen and any stomach acid medications ending in "prazole" such as Protonix (pantoprazole) and Nexium (esomeprazole).    ## NOT FOR PATIENT, FOR DOCUMENTATION ONLY## Creatinine clearance was calculated at 24.6-30.1 mL/min and maximum Namenda dose will be current dose of 10 mg total per day.    Jayesh Dolan MD  "

## 2023-08-15 ENCOUNTER — PATIENT OUTREACH (OUTPATIENT)
Dept: ADMINISTRATIVE | Facility: HOSPITAL | Age: 68
End: 2023-08-15
Payer: MEDICARE

## 2023-08-15 NOTE — PROGRESS NOTES
Health Maintenance Due   Topic Date Due    COVID-19 Vaccine (1) Never done    Pneumococcal Vaccines (Age 65+) (1 - PCV) Never done    TETANUS VACCINE  Never done    Mammogram  Never done    DEXA Scan  Never done    Colorectal Cancer Screening  Never done    Shingles Vaccine (1 of 2) Never done        Chart review done.   HM updated.   Immunizations reviewed & updated.   Care Everywhere updated.   DIS reviewed

## 2023-08-17 ENCOUNTER — OUTPATIENT CASE MANAGEMENT (OUTPATIENT)
Dept: NEUROLOGY | Facility: CLINIC | Age: 68
End: 2023-08-17
Payer: MEDICARE

## 2023-08-17 NOTE — PROGRESS NOTES
CG is pt's DIL and pt's children are making the decisions regarding pt.  CG said she is not able to implement the suggestions made by Care Ecosystem CTN so she would like to withdraw.

## 2023-09-20 ENCOUNTER — TELEPHONE (OUTPATIENT)
Dept: NEUROLOGY | Facility: CLINIC | Age: 68
End: 2023-09-20
Payer: MEDICARE

## 2023-09-20 NOTE — TELEPHONE ENCOUNTER
Cg called SW and explained that pt continues to try to wander and recently went missing for about 43 hours.  She explained that the family is going to look at a nursing home this week.  She said the nursing home needs an order from the dr and needs pt's medical records.  MECHELLE provided CG with behavior management tips to help mitigate wandering dangers, provided the phone number to medical records at [facility], and advised CG to send paperwork to PCP for completion.  In addition, MECHELLE provided the name and number of Isela Adam LCSW for future SW needs.

## 2023-09-27 ENCOUNTER — TELEPHONE (OUTPATIENT)
Dept: NEUROLOGY | Facility: CLINIC | Age: 68
End: 2023-09-27
Payer: MEDICARE

## 2023-09-27 NOTE — TELEPHONE ENCOUNTER
Ms. Zavala, pt's DIL called SW and LVM stating family has found a NH placement for the pt and requesting that SW complete a PASSAR noting it is required by the NH.  SW returned call and explained that unfortunately this SW does not have adequate knowledge of the pt to complete the form.  Recommended that family contact pt's PCP to complete it.    In addition, MECHELLE reiterated that since pt is no longer in the care eco program her neuro  services will now be provided by Isela Adam LCSW.  Lastly, MECHELLE sent Ms. Zavala an email with a blank PASSAR attached and included the contact information for Isela Adam LCSW.

## 2023-10-20 ENCOUNTER — HOSPITAL ENCOUNTER (OUTPATIENT)
Facility: HOSPITAL | Age: 68
Discharge: PSYCHIATRIC OR SUBSTANCE USE FACILITY | End: 2023-10-21
Attending: EMERGENCY MEDICINE | Admitting: STUDENT IN AN ORGANIZED HEALTH CARE EDUCATION/TRAINING PROGRAM
Payer: MEDICARE

## 2023-10-20 DIAGNOSIS — R07.9 CHEST PAIN: ICD-10-CM

## 2023-10-20 DIAGNOSIS — F03.918 DEMENTIA WITH BEHAVIORAL DISTURBANCE: ICD-10-CM

## 2023-10-20 DIAGNOSIS — R40.0 SOMNOLENCE: ICD-10-CM

## 2023-10-20 DIAGNOSIS — R00.1 BRADYCARDIA: Primary | ICD-10-CM

## 2023-10-20 PROBLEM — N18.30 CKD STAGE 3 DUE TO TYPE 2 DIABETES MELLITUS: Status: RESOLVED | Noted: 2023-05-19 | Resolved: 2023-10-20

## 2023-10-20 PROBLEM — I10 ESSENTIAL HYPERTENSION: Chronic | Status: ACTIVE | Noted: 2019-09-28

## 2023-10-20 PROBLEM — E11.22 CKD STAGE 3 DUE TO TYPE 2 DIABETES MELLITUS: Status: RESOLVED | Noted: 2023-05-19 | Resolved: 2023-10-20

## 2023-10-20 PROBLEM — I10 ESSENTIAL HYPERTENSION: Status: ACTIVE | Noted: 2019-09-28

## 2023-10-20 LAB
ALBUMIN SERPL BCP-MCNC: 3.7 G/DL (ref 3.5–5.2)
ALP SERPL-CCNC: 77 U/L (ref 55–135)
ALT SERPL W/O P-5'-P-CCNC: 11 U/L (ref 10–44)
AMPHET+METHAMPHET UR QL: NEGATIVE
ANION GAP SERPL CALC-SCNC: 7 MMOL/L (ref 8–16)
ASCENDING AORTA: 3.18 CM
AST SERPL-CCNC: 21 U/L (ref 10–40)
AV INDEX (PROSTH): 0.71
AV MEAN GRADIENT: 6 MMHG
AV PEAK GRADIENT: 11 MMHG
AV VALVE AREA BY VELOCITY RATIO: 2.22 CM²
AV VALVE AREA: 2.31 CM²
AV VELOCITY RATIO: 0.68
BARBITURATES UR QL SCN>200 NG/ML: NEGATIVE
BASOPHILS # BLD AUTO: 0.01 K/UL (ref 0–0.2)
BASOPHILS NFR BLD: 0.1 % (ref 0–1.9)
BENZODIAZ UR QL SCN>200 NG/ML: NEGATIVE
BILIRUB SERPL-MCNC: 0.4 MG/DL (ref 0.1–1)
BILIRUB UR QL STRIP: NEGATIVE
BSA FOR ECHO PROCEDURE: 1.99 M2
BUN SERPL-MCNC: 30 MG/DL (ref 8–23)
BZE UR QL SCN: NEGATIVE
CALCIUM SERPL-MCNC: 9.4 MG/DL (ref 8.7–10.5)
CANNABINOIDS UR QL SCN: NEGATIVE
CHLORIDE SERPL-SCNC: 112 MMOL/L (ref 95–110)
CLARITY UR: CLEAR
CO2 SERPL-SCNC: 26 MMOL/L (ref 23–29)
COLOR UR: COLORLESS
CREAT SERPL-MCNC: 1.9 MG/DL (ref 0.5–1.4)
CREAT UR-MCNC: 75.8 MG/DL (ref 15–325)
CV ECHO LV RWT: 0.48 CM
DIFFERENTIAL METHOD: ABNORMAL
DOP CALC AO PEAK VEL: 1.63 M/S
DOP CALC AO VTI: 35.8 CM
DOP CALC LVOT AREA: 3.3 CM2
DOP CALC LVOT DIAMETER: 2.04 CM
DOP CALC LVOT PEAK VEL: 1.11 M/S
DOP CALC LVOT STROKE VOLUME: 82.65 CM3
DOP CALC MV VTI: 29.7 CM
DOP CALCLVOT PEAK VEL VTI: 25.3 CM
E WAVE DECELERATION TIME: 318.63 MSEC
E/A RATIO: 0.63
E/E' RATIO: 9.38 M/S
ECHO LV POSTERIOR WALL: 1.25 CM (ref 0.6–1.1)
EOSINOPHIL # BLD AUTO: 0.1 K/UL (ref 0–0.5)
EOSINOPHIL NFR BLD: 0.7 % (ref 0–8)
ERYTHROCYTE [DISTWIDTH] IN BLOOD BY AUTOMATED COUNT: 14.1 % (ref 11.5–14.5)
EST. GFR  (NO RACE VARIABLE): 28 ML/MIN/1.73 M^2
FRACTIONAL SHORTENING: 35 % (ref 28–44)
GLUCOSE SERPL-MCNC: 80 MG/DL (ref 70–110)
GLUCOSE UR QL STRIP: NEGATIVE
HCT VFR BLD AUTO: 32.3 % (ref 37–48.5)
HGB BLD-MCNC: 10.3 G/DL (ref 12–16)
HGB UR QL STRIP: NEGATIVE
IMM GRANULOCYTES # BLD AUTO: 0.01 K/UL (ref 0–0.04)
IMM GRANULOCYTES NFR BLD AUTO: 0.1 % (ref 0–0.5)
INTERVENTRICULAR SEPTUM: 1.49 CM (ref 0.6–1.1)
IVC DIAMETER: 2.3 CM
IVRT: 121.79 MSEC
KETONES UR QL STRIP: NEGATIVE
LA MAJOR: 6.39 CM
LA MINOR: 5.67 CM
LA WIDTH: 4.8 CM
LEFT ATRIUM SIZE: 4.73 CM
LEFT ATRIUM VOLUME INDEX: 60.1 ML/M2
LEFT ATRIUM VOLUME: 115.95 CM3
LEFT INTERNAL DIMENSION IN SYSTOLE: 3.4 CM (ref 2.1–4)
LEFT VENTRICLE DIASTOLIC VOLUME INDEX: 68.66 ML/M2
LEFT VENTRICLE DIASTOLIC VOLUME: 132.51 ML
LEFT VENTRICLE MASS INDEX: 158 G/M2
LEFT VENTRICLE SYSTOLIC VOLUME INDEX: 24.6 ML/M2
LEFT VENTRICLE SYSTOLIC VOLUME: 47.56 ML
LEFT VENTRICULAR INTERNAL DIMENSION IN DIASTOLE: 5.25 CM (ref 3.5–6)
LEFT VENTRICULAR MASS: 304.6 G
LEUKOCYTE ESTERASE UR QL STRIP: NEGATIVE
LV LATERAL E/E' RATIO: 8.71 M/S
LV SEPTAL E/E' RATIO: 10.17 M/S
LVOT MG: 2.63 MMHG
LVOT MV: 0.77 CM/S
LYMPHOCYTES # BLD AUTO: 1.4 K/UL (ref 1–4.8)
LYMPHOCYTES NFR BLD: 20 % (ref 18–48)
MAGNESIUM SERPL-MCNC: 2 MG/DL (ref 1.6–2.6)
MCH RBC QN AUTO: 26.8 PG (ref 27–31)
MCHC RBC AUTO-ENTMCNC: 31.9 G/DL (ref 32–36)
MCV RBC AUTO: 84 FL (ref 82–98)
METHADONE UR QL SCN>300 NG/ML: NEGATIVE
MONOCYTES # BLD AUTO: 0.3 K/UL (ref 0.3–1)
MONOCYTES NFR BLD: 4 % (ref 4–15)
MV MEAN GRADIENT: 1 MMHG
MV PEAK A VEL: 0.97 M/S
MV PEAK E VEL: 0.61 M/S
MV PEAK GRADIENT: 4 MMHG
MV STENOSIS PRESSURE HALF TIME: 92.4 MS
MV VALVE AREA BY CONTINUITY EQUATION: 2.78 CM2
MV VALVE AREA P 1/2 METHOD: 2.38 CM2
NEUTROPHILS # BLD AUTO: 5.2 K/UL (ref 1.8–7.7)
NEUTROPHILS NFR BLD: 75.1 % (ref 38–73)
NITRITE UR QL STRIP: NEGATIVE
NRBC BLD-RTO: 0 /100 WBC
OPIATES UR QL SCN: NEGATIVE
PCP UR QL SCN>25 NG/ML: NEGATIVE
PH UR STRIP: 7 [PH] (ref 5–8)
PISA TR MAX VEL: 2.62 M/S
PLATELET # BLD AUTO: 148 K/UL (ref 150–450)
PMV BLD AUTO: 12.6 FL (ref 9.2–12.9)
POTASSIUM SERPL-SCNC: 4.5 MMOL/L (ref 3.5–5.1)
PROT SERPL-MCNC: 7.1 G/DL (ref 6–8.4)
PROT UR QL STRIP: NEGATIVE
PULM VEIN S/D RATIO: 2.95
PV PEAK D VEL: 0.21 M/S
PV PEAK GRADIENT: 6 MMHG
PV PEAK S VEL: 0.62 M/S
PV PEAK VELOCITY: 1.23 M/S
RA MAJOR: 6.22 CM
RA PRESSURE ESTIMATED: 8 MMHG
RA WIDTH: 4 CM
RBC # BLD AUTO: 3.85 M/UL (ref 4–5.4)
RIGHT VENTRICULAR END-DIASTOLIC DIMENSION: 3.63 CM
RV TB RVSP: 11 MMHG
RV TISSUE DOPPLER FREE WALL SYSTOLIC VELOCITY 1 (APICAL 4 CHAMBER VIEW): 18.23 CM/S
SINUS: 3.22 CM
SODIUM SERPL-SCNC: 145 MMOL/L (ref 136–145)
SP GR UR STRIP: 1.01 (ref 1–1.03)
STJ: 2.35 CM
TDI LATERAL: 0.07 M/S
TDI SEPTAL: 0.06 M/S
TDI: 0.07 M/S
TOXICOLOGY INFORMATION: NORMAL
TR MAX PG: 27 MMHG
TRICUSPID ANNULAR PLANE SYSTOLIC EXCURSION: 2.6 CM
TSH SERPL DL<=0.005 MIU/L-ACNC: 1.22 UIU/ML (ref 0.4–4)
TV REST PULMONARY ARTERY PRESSURE: 35 MMHG
URN SPEC COLLECT METH UR: ABNORMAL
UROBILINOGEN UR STRIP-ACNC: NEGATIVE EU/DL
WBC # BLD AUTO: 6.99 K/UL (ref 3.9–12.7)
Z-SCORE OF LEFT VENTRICULAR DIMENSION IN END DIASTOLE: -0.4
Z-SCORE OF LEFT VENTRICULAR DIMENSION IN END SYSTOLE: 0.09

## 2023-10-20 PROCEDURE — 99204 PR OFFICE/OUTPT VISIT, NEW, LEVL IV, 45-59 MIN: ICD-10-PCS | Mod: 25,,, | Performed by: INTERNAL MEDICINE

## 2023-10-20 PROCEDURE — 80053 COMPREHEN METABOLIC PANEL: CPT | Performed by: EMERGENCY MEDICINE

## 2023-10-20 PROCEDURE — 63600175 PHARM REV CODE 636 W HCPCS: Performed by: HOSPITALIST

## 2023-10-20 PROCEDURE — 99285 EMERGENCY DEPT VISIT HI MDM: CPT | Mod: 25

## 2023-10-20 PROCEDURE — 93010 EKG 12-LEAD: ICD-10-PCS | Mod: ,,, | Performed by: INTERNAL MEDICINE

## 2023-10-20 PROCEDURE — 80307 DRUG TEST PRSMV CHEM ANLYZR: CPT | Performed by: HOSPITALIST

## 2023-10-20 PROCEDURE — 96372 THER/PROPH/DIAG INJ SC/IM: CPT | Performed by: HOSPITALIST

## 2023-10-20 PROCEDURE — 93005 ELECTROCARDIOGRAM TRACING: CPT

## 2023-10-20 PROCEDURE — 83735 ASSAY OF MAGNESIUM: CPT | Performed by: EMERGENCY MEDICINE

## 2023-10-20 PROCEDURE — 84443 ASSAY THYROID STIM HORMONE: CPT | Performed by: EMERGENCY MEDICINE

## 2023-10-20 PROCEDURE — G0378 HOSPITAL OBSERVATION PER HR: HCPCS

## 2023-10-20 PROCEDURE — 81003 URINALYSIS AUTO W/O SCOPE: CPT | Mod: 59 | Performed by: HOSPITALIST

## 2023-10-20 PROCEDURE — 25000003 PHARM REV CODE 250: Performed by: HOSPITALIST

## 2023-10-20 PROCEDURE — 85025 COMPLETE CBC W/AUTO DIFF WBC: CPT | Performed by: EMERGENCY MEDICINE

## 2023-10-20 PROCEDURE — 93010 ELECTROCARDIOGRAM REPORT: CPT | Mod: ,,, | Performed by: INTERNAL MEDICINE

## 2023-10-20 PROCEDURE — G0425 INPT/ED TELECONSULT30: HCPCS | Mod: 95,,, | Performed by: PSYCHIATRY & NEUROLOGY

## 2023-10-20 PROCEDURE — G0425 PR INPT TELEHEALTH CONSULT 30M: ICD-10-PCS | Mod: 95,,, | Performed by: PSYCHIATRY & NEUROLOGY

## 2023-10-20 PROCEDURE — 99204 OFFICE O/P NEW MOD 45 MIN: CPT | Mod: 25,,, | Performed by: INTERNAL MEDICINE

## 2023-10-20 RX ORDER — MAG HYDROX/ALUMINUM HYD/SIMETH 200-200-20
30 SUSPENSION, ORAL (FINAL DOSE FORM) ORAL 4 TIMES DAILY PRN
Status: DISCONTINUED | OUTPATIENT
Start: 2023-10-20 | End: 2023-10-21 | Stop reason: HOSPADM

## 2023-10-20 RX ORDER — HALOPERIDOL 5 MG/ML
5 INJECTION INTRAMUSCULAR EVERY 6 HOURS PRN
COMMUNITY
End: 2023-10-21

## 2023-10-20 RX ORDER — LOSARTAN POTASSIUM 100 MG/1
100 TABLET ORAL DAILY
COMMUNITY
End: 2023-10-21

## 2023-10-20 RX ORDER — LORAZEPAM 2 MG/ML
2 INJECTION INTRAMUSCULAR EVERY 6 HOURS PRN
COMMUNITY
End: 2023-10-21

## 2023-10-20 RX ORDER — VALSARTAN 160 MG/1
160 TABLET ORAL DAILY
Status: ON HOLD | COMMUNITY
Start: 2023-09-26 | End: 2023-11-10 | Stop reason: HOSPADM

## 2023-10-20 RX ORDER — TRAZODONE HYDROCHLORIDE 50 MG/1
50 TABLET ORAL NIGHTLY
COMMUNITY
End: 2023-10-21

## 2023-10-20 RX ORDER — DIPHENHYDRAMINE HYDROCHLORIDE 50 MG/ML
50 INJECTION INTRAMUSCULAR; INTRAVENOUS EVERY 6 HOURS PRN
COMMUNITY
End: 2023-10-21

## 2023-10-20 RX ORDER — OLANZAPINE 2.5 MG/1
2.5 TABLET ORAL NIGHTLY
COMMUNITY

## 2023-10-20 RX ORDER — ACETAMINOPHEN 325 MG/1
650 TABLET ORAL EVERY 6 HOURS PRN
Status: DISCONTINUED | OUTPATIENT
Start: 2023-10-20 | End: 2023-10-21 | Stop reason: HOSPADM

## 2023-10-20 RX ORDER — SIMETHICONE 80 MG
1 TABLET,CHEWABLE ORAL 4 TIMES DAILY PRN
Status: DISCONTINUED | OUTPATIENT
Start: 2023-10-20 | End: 2023-10-21 | Stop reason: HOSPADM

## 2023-10-20 RX ORDER — IBUPROFEN 200 MG
16 TABLET ORAL
Status: DISCONTINUED | OUTPATIENT
Start: 2023-10-20 | End: 2023-10-21 | Stop reason: HOSPADM

## 2023-10-20 RX ORDER — GLUCAGON 1 MG
1 KIT INJECTION
Status: DISCONTINUED | OUTPATIENT
Start: 2023-10-20 | End: 2023-10-21 | Stop reason: HOSPADM

## 2023-10-20 RX ORDER — ATORVASTATIN CALCIUM 10 MG/1
20 TABLET, FILM COATED ORAL DAILY
Status: DISCONTINUED | OUTPATIENT
Start: 2023-10-21 | End: 2023-10-21 | Stop reason: HOSPADM

## 2023-10-20 RX ORDER — MEMANTINE HYDROCHLORIDE 5 MG/1
5 TABLET ORAL 2 TIMES DAILY
Status: DISCONTINUED | OUTPATIENT
Start: 2023-10-20 | End: 2023-10-21

## 2023-10-20 RX ORDER — IBUPROFEN 200 MG
24 TABLET ORAL
Status: DISCONTINUED | OUTPATIENT
Start: 2023-10-20 | End: 2023-10-21 | Stop reason: HOSPADM

## 2023-10-20 RX ORDER — ONDANSETRON 2 MG/ML
4 INJECTION INTRAMUSCULAR; INTRAVENOUS EVERY 8 HOURS PRN
Status: DISCONTINUED | OUTPATIENT
Start: 2023-10-20 | End: 2023-10-21 | Stop reason: HOSPADM

## 2023-10-20 RX ORDER — SODIUM CHLORIDE 0.9 % (FLUSH) 0.9 %
10 SYRINGE (ML) INJECTION EVERY 8 HOURS PRN
Status: DISCONTINUED | OUTPATIENT
Start: 2023-10-20 | End: 2023-10-21 | Stop reason: HOSPADM

## 2023-10-20 RX ORDER — DONEPEZIL HYDROCHLORIDE 5 MG/1
5 TABLET, FILM COATED ORAL NIGHTLY
Status: DISCONTINUED | OUTPATIENT
Start: 2023-10-20 | End: 2023-10-21

## 2023-10-20 RX ORDER — TALC
6 POWDER (GRAM) TOPICAL NIGHTLY PRN
Status: DISCONTINUED | OUTPATIENT
Start: 2023-10-20 | End: 2023-10-21 | Stop reason: HOSPADM

## 2023-10-20 RX ORDER — PROCHLORPERAZINE EDISYLATE 5 MG/ML
5 INJECTION INTRAMUSCULAR; INTRAVENOUS EVERY 6 HOURS PRN
Status: DISCONTINUED | OUTPATIENT
Start: 2023-10-20 | End: 2023-10-21

## 2023-10-20 RX ORDER — ENOXAPARIN SODIUM 100 MG/ML
30 INJECTION SUBCUTANEOUS EVERY 24 HOURS
Status: DISCONTINUED | OUTPATIENT
Start: 2023-10-20 | End: 2023-10-21 | Stop reason: HOSPADM

## 2023-10-20 RX ORDER — POLYETHYLENE GLYCOL 3350 17 G/17G
17 POWDER, FOR SOLUTION ORAL DAILY
Status: DISCONTINUED | OUTPATIENT
Start: 2023-10-20 | End: 2023-10-21 | Stop reason: HOSPADM

## 2023-10-20 RX ORDER — TRAZODONE HYDROCHLORIDE 50 MG/1
50 TABLET ORAL NIGHTLY
Status: DISCONTINUED | OUTPATIENT
Start: 2023-10-20 | End: 2023-10-21

## 2023-10-20 RX ORDER — NALOXONE HCL 0.4 MG/ML
0.02 VIAL (ML) INJECTION
Status: DISCONTINUED | OUTPATIENT
Start: 2023-10-20 | End: 2023-10-21 | Stop reason: HOSPADM

## 2023-10-20 RX ORDER — VALSARTAN 80 MG/1
160 TABLET ORAL DAILY
Status: DISCONTINUED | OUTPATIENT
Start: 2023-10-21 | End: 2023-10-21 | Stop reason: HOSPADM

## 2023-10-20 RX ORDER — DONEPEZIL HYDROCHLORIDE 5 MG/1
5 TABLET, FILM COATED ORAL NIGHTLY
COMMUNITY
End: 2023-10-21

## 2023-10-20 RX ORDER — INSULIN LISPRO 100 [IU]/ML
0-12 INJECTION, SOLUTION INTRAVENOUS; SUBCUTANEOUS
COMMUNITY
End: 2023-10-21

## 2023-10-20 RX ADMIN — MEMANTINE HYDROCHLORIDE 5 MG: 5 TABLET ORAL at 08:10

## 2023-10-20 RX ADMIN — DONEPEZIL HYDROCHLORIDE 5 MG: 5 TABLET ORAL at 08:10

## 2023-10-20 RX ADMIN — ENOXAPARIN SODIUM 30 MG: 30 INJECTION SUBCUTANEOUS at 05:10

## 2023-10-20 NOTE — ED PROVIDER NOTES
Encounter Date: 10/20/2023    SCRIBE #1 NOTE: I, Asmita Ross, am scribing for, and in the presence of,  Jero Levine MD. I have scribed the following portions of the note - Other sections scribed: HPI, ROS.       History     Chief Complaint   Patient presents with    Bradycardia     Arrives viam ems from [facility] with c/o bradycardia. Ems reports p was found by staff to have HR in 30s and hard to arouse. Reports pt received Zyprexa and ativan at 2230 last night. Ems reports HR in 40s, states giving 1mg atropine. Per ems pt baseline is AAOx1. Pt currently drowsy but oriented to self. Hx dementia.      Tyra Zavala is a 68 y.o. female, with a past medical history of dementia and HTN, who presents to the ED via EMS from [facility] with bradycardia that began today. Per independent historian, EMS, patient was found by [facility] staff with HR in 30s and hard to arouse. Patient was given Zyprexa and ativan at 10:30 pm last night. EMS reports HR in 40s and gave 1 mg atropine. Per EMS, baseline is AAOx1. Patient reports associated symptoms of rhinorrhea and cough for 3 days. Patient denies chest pain, fever, chills, abdominal pain, nausea, vomiting, diarrhea, dysuria, headaches, sore throat, arm or leg trouble, eye pain, ear pain, rash, or other associated symptoms. NKDA.        The history is provided by the nursing home and the EMS personnel. No  was used.     Review of patient's allergies indicates:  No Known Allergies  Past Medical History:   Diagnosis Date    Hypertension      Past Surgical History:   Procedure Laterality Date    APPENDECTOMY       No family history on file.  Social History     Tobacco Use    Smoking status: Never    Smokeless tobacco: Never   Substance Use Topics    Alcohol use: Yes     Comment: occasional    Drug use: Never     Review of Systems   Constitutional:  Negative for chills, diaphoresis and fever.   HENT:  Positive for rhinorrhea. Negative for ear pain and sore  throat.    Eyes:  Negative for pain.   Respiratory:  Positive for cough. Negative for shortness of breath.    Cardiovascular:  Negative for chest pain.        (+) bradycardia   Gastrointestinal:  Negative for abdominal pain, diarrhea, nausea and vomiting.   Genitourinary:  Negative for dysuria.   Musculoskeletal:  Negative for back pain.        (-) Arm or leg trouble.    Skin:  Negative for rash.   Neurological:  Negative for headaches.   Psychiatric/Behavioral:  Negative for confusion.        Physical Exam     Initial Vitals [10/20/23 1138]   BP Pulse Resp Temp SpO2   (!) 165/74 62 20 97.7 °F (36.5 °C) 100 %      MAP       --         Physical Exam  The patient was examined specifically for the following:   General:No significant distress, Good color, Warm and dry. Head and neck:Scalp atraumatic, Neck supple. Neurological:Appropriate conversation, Gross motor deficits. Eyes:Conjugate gaze, Clear corneas. ENT: No epistaxis. Cardiac: Regular rate and rhythm, Grossly normal heart tones. Pulmonary: Wheezing, Rales. Gastrointestinal: Abdominal tenderness, Abdominal distention. Musculoskeletal: Extremity deformity, Apparent pain with range of motion of the joints. Skin: Rash.   The findings on examination were normal except for the following:  Holds her eyes closed.  She will respond normally to conversation.  Cranial nerves motor and sensory examination grossly unremarkable.  The patient's heart rate is 62.  Lungs are clear and free of wheezing rales rubs or rhonchi.  Heart tones are normal.  The patient has regular rate and rhythm.  The abdomen is soft.  There is no abdominal tenderness.  ED Course   Procedures  Labs Reviewed   COMPREHENSIVE METABOLIC PANEL - Abnormal; Notable for the following components:       Result Value    Chloride 112 (*)     BUN 30 (*)     Creatinine 1.9 (*)     eGFR 28 (*)     Anion Gap 7 (*)     All other components within normal limits   CBC W/ AUTO DIFFERENTIAL - Abnormal; Notable for the  following components:    RBC 3.85 (*)     Hemoglobin 10.3 (*)     Hematocrit 32.3 (*)     MCH 26.8 (*)     MCHC 31.9 (*)     Platelets 148 (*)     Gran % 75.1 (*)     All other components within normal limits   MAGNESIUM   TSH     EKG Readings: (Independently Interpreted)   This patient is in a normal sinus rhythm heart rate of 66.  There are no significant ST segment or T-wave changes.  The axis is normal.  Diffuse T-wave flattening.       Imaging Results    None            Medications - No data to display  Medical Decision Making  Amount and/or Complexity of Data Reviewed  Independent Historian: EMS  Labs: ordered.    Risk  Prescription drug management.    Given the above, this patient has a heart rate of 38 in the field and was difficult to arouse.  She was treated with atropine in the field by EMS.  She comes to the emergency room for an evaluation.  The patient has dementia.  She has no significant complaints.  There is no chest pain pressure tightness or shortness of breath.  The patient has some chronic renal insufficiency a mild anemia that is stable and chronic.  The patient will be admitted for monitoring and evaluation by Cardiology.  Discussed this case with YING Ruiz, who accepts this patient onto his service.         Scribe Attestation:   Scribe #1: I performed the above scribed service and the documentation accurately describes the services I performed. I attest to the accuracy of the note.                      Please note that the documentation on this chart was provided by the scribe above on the date of service noted above, and that the documentation in the chart accurately reflects the work and decisions made by me alone.  Signed, Dr. Levine  Clinical Impression:   Final diagnoses:  [R00.1] Bradycardia (Primary)  [F03.918] Dementia with behavioral disturbance  [R40.0] Somnolence        ED Disposition Condition    Observation Stable                Jero Levine MD  10/20/23 7161       Julianna  Jero GREENWOOD MD  10/21/23 1391

## 2023-10-20 NOTE — HPI
Patient with altered sensorium.  History mostly obtained from the chart.  Patient somnolent and difficult to wake up.  When I woke her up she is able to tell me that she denies any chest pains, or shortness of breath.  Resting comfortably in bed.  Tele monitoring shows sinus bradycardia with heart rates around 42-48 beats per minute.  Cardiology has been consulted for bradycardia.  Denies any chest pains at rest on exertion, orthopnea, PND.      History of present illness       Bradycardia       Arrives viam ems from [facility] with c/o bradycardia. Ems reports p was found by staff to have HR in 30s and hard to arouse. Reports pt received Zyprexa and ativan at 2230 last night. Ems reports HR in 40s, states giving 1mg atropine. Per ems pt baseline is AAOx1. Pt currently drowsy but oriented to self. Hx dementia.       Tyra Zavala is a 68 y.o. female, with a past medical history of dementia and HTN, who [facility] that began today. Per independent historian, EMS, patient [facility] by [facility] staff with HR in 30s and hard to arouse. Patient was given Zyprexa and ativan at 10:30 pm last night. EMS reports HR in 40s and gave 1 mg atropine. Per EMS, baseline is AAOx1. Patient reports associated symptoms of rhinorrhea and cough for 3 days. Patient denies chest pain, fever, chills, abdominal pain, nausea, vomiting, diarrhea, dysuria, headaches, sore throat, arm or leg trouble, eye pain, ear pain, rash, or other associated symptoms. NKDA.

## 2023-10-20 NOTE — ED TRIAGE NOTES
Tyra Zavala, a 68 y.o. female presents to the ED EMS from [facility] with bradycardia that began today. Per independent historian, EMS, patient was found by [facility] staff with HR in 30s and hard to arouse. Per staff, patient was given Zyprexa and ativan at 10:30 pm last night due to her being agitated. EMS reports HR in 40s when they arrived on scene and gave 1 mg atropine with a positive response. Per EMS, baseline is AAOx1. Patient reports associated symptoms of rhinorrhea and cough for 3 days. Patient denies chest pain, fever, chills, abdominal pain, nausea, vomiting, diarrhea, dysuria, headaches, sore throat, arm or leg trouble, eye pain, ear pain, rash, or other associated symptoms. HR is currently 61 bpm. NADN.     Triage note:  Chief Complaint   Patient presents with    Bradycardia     [facility]ady[facility]. Ems reports p was found by staff to have HR in 30s and hard to arouse. Reports pt received Zyprexa and ativan at 2230 last night. Ems reports HR in 40s, states giving 1mg atropine. Per ems pt baseline is AAOx1. Pt currently drowsy but oriented to self. Hx dementia.      Review of patient's allergies indicates:  No Known Allergies  Past Medical History:   Diagnosis Date    CKD (chronic kidney disease)     Dementia     HLD (hyperlipidemia)     Hyperkalemia     Hypernatremia     Hypertension

## 2023-10-20 NOTE — SUBJECTIVE & OBJECTIVE
Past Medical History:   Diagnosis Date    CKD (chronic kidney disease)     Dementia     HLD (hyperlipidemia)     Hyperkalemia     Hypernatremia     Hypertension        Past Surgical History:   Procedure Laterality Date    APPENDECTOMY         Review of patient's allergies indicates:  No Known Allergies    No current facility-administered medications on file prior to encounter.     Current Outpatient Medications on File Prior to Encounter   Medication Sig    atorvastatin (LIPITOR) 20 MG tablet     diphenhydrAMINE (BENADRYL) 50 mg/mL injection Inject 50 mg into the muscle every 6 (six) hours as needed for Itching (agitation).    donepeziL (ARICEPT) 5 MG tablet Take 5 mg by mouth every evening.    haloperidol lactate (HALDOL) 5 mg/mL injection Inject 5 mg into the muscle every 6 (six) hours as needed (agitation).    hydroCHLOROthiazide (HYDRODIURIL) 25 MG tablet Take 25 mg by mouth.    insulin lispro 100 unit/mL injection Inject 0-12 Units into the skin 3 (three) times daily before meals. TID before meals and at bedtime  Sliding Scale:  0-60= 0 units  = 0 units  201-250= 4 units  251-300= 6 units  301-350= 8 units  351-400= 10 units  >401 CALL MD= 12 units    LORazepam (ATIVAN) 2 mg/mL injection Inject 2 mg into the muscle every 6 (six) hours as needed (agitation).    losartan (COZAAR) 100 MG tablet Take 100 mg by mouth once daily.    memantine (NAMENDA) 5 MG Tab Take 1 tablet (5 mg total) by mouth 2 (two) times daily.    OLANZapine (ZYPREXA) 2.5 MG tablet Take 2.5 mg by mouth every evening.    traZODone (DESYREL) 50 MG tablet Take 50 mg by mouth every evening.    acetaminophen (TYLENOL) 500 MG tablet Take 2 tablets (1,000 mg total) by mouth every 8 (eight) hours as needed for Pain.    LORazepam (ATIVAN) 0.5 MG tablet One po q 8 hr prn agitation or insomnia. Not intended as daily medication. Do not drive (even bike) or perform dangerous tasks while taking this medication    valsartan (DIOVAN) 160 MG tablet Take  160 mg by mouth once daily.     Family History    None       Tobacco Use    Smoking status: Never    Smokeless tobacco: Never   Substance and Sexual Activity    Alcohol use: Yes     Comment: occasional    Drug use: Never    Sexual activity: Not on file     Review of Systems   Unable to perform ROS: Mental status change     Objective:     Vital Signs (Most Recent):  Temp: 97.8 °F (36.6 °C) (10/20/23 1554)  Pulse: 63 (10/20/23 1729)  Resp: 19 (10/20/23 1723)  BP: (!) 161/69 (10/20/23 1703)  SpO2: 98 % (10/20/23 1728) Vital Signs (24h Range):  Temp:  [97.7 °F (36.5 °C)-97.8 °F (36.6 °C)] 97.8 °F (36.6 °C)  Pulse:  [46-65] 63  Resp:  [10-20] 19  SpO2:  [98 %-100 %] 98 %  BP: (138-178)/(65-95) 161/69     Weight: 87.5 kg (193 lb)  Body mass index is 33.13 kg/m².    SpO2: 98 %       No intake or output data in the 24 hours ending 10/20/23 1745    Lines/Drains/Airways       Drain  Duration             Female External Urinary Catheter 10/20/23 <1 day              Peripheral Intravenous Line  Duration                  Peripheral IV - Single Lumen 10/20/23 1140 20 G Left Antecubital <1 day                     Physical Exam  Constitutional:       Appearance: Normal appearance. She is well-developed.   HENT:      Head: Normocephalic.   Eyes:      Pupils: Pupils are equal, round, and reactive to light.   Cardiovascular:      Rate and Rhythm: Regular rhythm. Bradycardia present.   Pulmonary:      Effort: Pulmonary effort is normal.      Breath sounds: Normal breath sounds.   Abdominal:      General: Bowel sounds are normal.      Palpations: Abdomen is soft.      Tenderness: There is no abdominal tenderness.   Musculoskeletal:         General: Normal range of motion.      Cervical back: Normal range of motion and neck supple.   Skin:     General: Skin is warm.   Neurological:      Mental Status: She is alert. She is disoriented.          Significant Labs: BMP:   Recent Labs   Lab 10/20/23  1324   GLU 80      K 4.5   *  "  CO2 26   BUN 30*   CREATININE 1.9*   CALCIUM 9.4   MG 2.0   , CMP   Recent Labs   Lab 10/20/23  1324      K 4.5   *   CO2 26   GLU 80   BUN 30*   CREATININE 1.9*   CALCIUM 9.4   PROT 7.1   ALBUMIN 3.7   BILITOT 0.4   ALKPHOS 77   AST 21   ALT 11   ANIONGAP 7*   , CBC   Recent Labs   Lab 10/20/23  1324   WBC 6.99   HGB 10.3*   HCT 32.3*   *   , INR No results for input(s): "INR", "PROTIME" in the last 48 hours., Lipid Panel No results for input(s): "CHOL", "HDL", "LDLCALC", "TRIG", "CHOLHDL" in the last 48 hours., Troponin No results for input(s): "TROPONINI" in the last 48 hours., and All pertinent lab results from the last 24 hours have been reviewed.    Significant Imaging: Echocardiogram: Transthoracic echo (TTE) complete (Cupid Only):   Results for orders placed or performed during the hospital encounter of 10/20/23   Echo   Result Value Ref Range    BSA 1.99 m2    LVOT stroke volume 82.65 cm3    LVIDd 5.25 3.5 - 6.0 cm    LV Systolic Volume 47.56 mL    LV Systolic Volume Index 24.6 mL/m2    LVIDs 3.40 2.1 - 4.0 cm    LV Diastolic Volume 132.51 mL    LV Diastolic Volume Index 68.66 mL/m2    IVS 1.49 (A) 0.6 - 1.1 cm    LVOT diameter 2.04 cm    LVOT area 3.3 cm2    FS 35 28 - 44 %    Left Ventricle Relative Wall Thickness 0.48 cm    Posterior Wall 1.25 (A) 0.6 - 1.1 cm    LV mass 304.60 g    LV Mass Index 158 g/m2    MV Peak E Hector 0.61 m/s    TDI LATERAL 0.07 m/s    TDI SEPTAL 0.06 m/s    E/E' ratio 9.38 m/s    MV Peak A Hector 0.97 m/s    TR Max Hector 2.62 m/s    E/A ratio 0.63     IVRT 121.79 msec    E wave deceleration time 318.63 msec    LV SEPTAL E/E' RATIO 10.17 m/s    LV LATERAL E/E' RATIO 8.71 m/s    PV Peak S Hector 0.62 m/s    PV Peak D Hector 0.21 m/s    Pulm vein S/D ratio 2.95     LVOT peak hector 1.11 m/s    Left Ventricular Outflow Tract Mean Velocity 0.77 cm/s    Left Ventricular Outflow Tract Mean Gradient 2.63 mmHg    LA size 4.73 cm    Left Atrium Minor Axis 5.67 cm    Left Atrium Major " Axis 6.39 cm    RVDD 3.63 cm    RV S' 18.23 cm/s    TAPSE 2.60 cm    RA Major Axis 6.22 cm    AV mean gradient 6 mmHg    AV peak gradient 11 mmHg    Ao peak tab 1.63 m/s    Ao VTI 35.80 cm    LVOT peak VTI 25.30 cm    AV valve area 2.31 cm²    AV Velocity Ratio 0.68     AV index (prosthetic) 0.71     CHINO by Velocity Ratio 2.22 cm²    MV mean gradient 1 mmHg    MV peak gradient 4 mmHg    MV stenosis pressure 1/2 time 92.40 ms    MV valve area p 1/2 method 2.38 cm2    MV valve area by continuity eq 2.78 cm2    MV VTI 29.7 cm    Triscuspid Valve Regurgitation Peak Gradient 27 mmHg    PV PEAK VELOCITY 1.23 m/s    PV peak gradient 6 mmHg    Sinus 3.22 cm    STJ 2.35 cm    Ascending aorta 3.18 cm    IVC diameter 2.30 cm    Mean e' 0.07 m/s    ZLVIDS 0.09     ZLVIDD -0.40     LA Volume Index 60.1 mL/m2    LA volume 115.95 cm3    LA WIDTH 4.8 cm    RA Width 4.0 cm    TV resting pulmonary artery pressure 35 mmHg    RV TB RVSP 11 mmHg    Est. RA pres 8 mmHg    Narrative      Left Ventricle: The left ventricle is normal in size. Mildly increased   wall thickness. Normal wall motion. There is normal systolic function with   a visually estimated ejection fraction of 60 - 65%. Grade I diastolic   dysfunction.    Left Atrium: Left atrium is severely dilated.    Right Ventricle: Normal right ventricular cavity size. Systolic   function is normal.    Right Atrium: Right atrium is mildly dilated.    Pulmonary Artery: The estimated pulmonary artery systolic pressure is   35 mmHg.    IVC/SVC: Intermediate venous pressure at 8 mmHg.

## 2023-10-20 NOTE — ASSESSMENT & PLAN NOTE
Bradycardia.  Monitor on tele to rule out any high-grade AV blocks.  When patient is sensorium comes back to normal, will walk around the floor to see if her heart rate goes up on walking.  Avoid AV maury blocking agents.

## 2023-10-20 NOTE — CONSULTS
[facility] [place] Emergency Dept  Cardiology  Consult Note    Patient Name: Tyra Zavala  MRN: [id number]  Admission Date: 10/20/2023  Hospital Length of Stay: 0 days  Code Status: Full Code   Attending Provider: Lang Saavedra III, MD   Consulting Provider: Abdiel Apodaca MD  Primary Care Physician: Jayesh Dolan MD  Principal Problem:Bradycardia    Patient information was obtained from patient and ER records.     Inpatient consult to Cardiology  Consult performed by: Abdiel Apodaca MD  Consult ordered by: Jero Levine MD        Subjective:     Chief Complaint:  ams     HPI:   Patient with altered sensorium.  History mostly obtained from the chart.  Patient somnolent and difficult to wake up.  When I woke her up she is able to tell me that she denies any chest pains, or shortness of breath.  Resting comfortably in bed.  Tele monitoring shows sinus bradycardia with heart rates around 42-48 beats per minute.  Cardiology has been consulted for bradycardia.  Denies any chest pains at rest on exertion, orthopnea, PND.      History of present illness       Bradycardia       Arrives viam ems from [facility] with c/o bradycardia. Ems reports p was found by staff to have HR in 30s and hard to arouse. Reports pt received Zyprexa and ativan at 2230 last night. Ems reports HR in 40s, states giving 1mg atropine. Per ems pt baseline is AAOx1. Pt currently drowsy but oriented to self. Hx dementia.       Tyra Zavala is a 68 y.o. female, with a past medical history of dementia and HTN, who presents to the ED via EMS from [facility] with bradycardia that began today. Per independent historian, EMS, patient was found by [facility] staff with HR in 30s and hard to arouse. Patient was given Zyprexa and ativan at 10:30 pm last night. EMS reports HR in 40s and gave 1 mg atropine. Per EMS, baseline is AAOx1. Patient reports associated symptoms of rhinorrhea and cough for 3 days. Patient denies chest pain, fever, chills, abdominal  pain, nausea, vomiting, diarrhea, dysuria, headaches, sore throat, arm or leg trouble, eye pain, ear pain, rash, or other associated symptoms. NKDA.      Past Medical History:   Diagnosis Date    CKD (chronic kidney disease)     Dementia     HLD (hyperlipidemia)     Hyperkalemia     Hypernatremia     Hypertension        Past Surgical History:   Procedure Laterality Date    APPENDECTOMY         Review of patient's allergies indicates:  No Known Allergies    No current facility-administered medications on file prior to encounter.     Current Outpatient Medications on File Prior to Encounter   Medication Sig    atorvastatin (LIPITOR) 20 MG tablet     diphenhydrAMINE (BENADRYL) 50 mg/mL injection Inject 50 mg into the muscle every 6 (six) hours as needed for Itching (agitation).    donepeziL (ARICEPT) 5 MG tablet Take 5 mg by mouth every evening.    haloperidol lactate (HALDOL) 5 mg/mL injection Inject 5 mg into the muscle every 6 (six) hours as needed (agitation).    hydroCHLOROthiazide (HYDRODIURIL) 25 MG tablet Take 25 mg by mouth.    insulin lispro 100 unit/mL injection Inject 0-12 Units into the skin 3 (three) times daily before meals. TID before meals and at bedtime  Sliding Scale:  0-60= 0 units  = 0 units  201-250= 4 units  251-300= 6 units  301-350= 8 units  351-400= 10 units  >401 CALL MD= 12 units    LORazepam (ATIVAN) 2 mg/mL injection Inject 2 mg into the muscle every 6 (six) hours as needed (agitation).    losartan (COZAAR) 100 MG tablet Take 100 mg by mouth once daily.    memantine (NAMENDA) 5 MG Tab Take 1 tablet (5 mg total) by mouth 2 (two) times daily.    OLANZapine (ZYPREXA) 2.5 MG tablet Take 2.5 mg by mouth every evening.    traZODone (DESYREL) 50 MG tablet Take 50 mg by mouth every evening.    acetaminophen (TYLENOL) 500 MG tablet Take 2 tablets (1,000 mg total) by mouth every 8 (eight) hours as needed for Pain.    LORazepam (ATIVAN) 0.5 MG tablet One po q 8 hr prn  agitation or insomnia. Not intended as daily medication. Do not drive (even bike) or perform dangerous tasks while taking this medication    valsartan (DIOVAN) 160 MG tablet Take 160 mg by mouth once daily.     Family History    None       Tobacco Use    Smoking status: Never    Smokeless tobacco: Never   Substance and Sexual Activity    Alcohol use: Yes     Comment: occasional    Drug use: Never    Sexual activity: Not on file     Review of Systems   Unable to perform ROS: Mental status change     Objective:     Vital Signs (Most Recent):  Temp: 97.8 °F (36.6 °C) (10/20/23 1554)  Pulse: 63 (10/20/23 1729)  Resp: 19 (10/20/23 1723)  BP: (!) 161/69 (10/20/23 1703)  SpO2: 98 % (10/20/23 1728) Vital Signs (24h Range):  Temp:  [97.7 °F (36.5 °C)-97.8 °F (36.6 °C)] 97.8 °F (36.6 °C)  Pulse:  [46-65] 63  Resp:  [10-20] 19  SpO2:  [98 %-100 %] 98 %  BP: (138-178)/(65-95) 161/69     Weight: 87.5 kg (193 lb)  Body mass index is 33.13 kg/m².    SpO2: 98 %       No intake or output data in the 24 hours ending 10/20/23 1745    Lines/Drains/Airways       Drain  Duration             Female External Urinary Catheter 10/20/23 <1 day              Peripheral Intravenous Line  Duration                  Peripheral IV - Single Lumen 10/20/23 1140 20 G Left Antecubital <1 day                     Physical Exam  Constitutional:       Appearance: Normal appearance. She is well-developed.   HENT:      Head: Normocephalic.   Eyes:      Pupils: Pupils are equal, round, and reactive to light.   Cardiovascular:      Rate and Rhythm: Regular rhythm. Bradycardia present.   Pulmonary:      Effort: Pulmonary effort is normal.      Breath sounds: Normal breath sounds.   Abdominal:      General: Bowel sounds are normal.      Palpations: Abdomen is soft.      Tenderness: There is no abdominal tenderness.   Musculoskeletal:         General: Normal range of motion.      Cervical back: Normal range of motion and neck supple.   Skin:     General:  "Skin is warm.   Neurological:      Mental Status: She is alert. She is disoriented.          Significant Labs: BMP:   Recent Labs   Lab 10/20/23  1324   GLU 80      K 4.5   *   CO2 26   BUN 30*   CREATININE 1.9*   CALCIUM 9.4   MG 2.0   , CMP   Recent Labs   Lab 10/20/23  1324      K 4.5   *   CO2 26   GLU 80   BUN 30*   CREATININE 1.9*   CALCIUM 9.4   PROT 7.1   ALBUMIN 3.7   BILITOT 0.4   ALKPHOS 77   AST 21   ALT 11   ANIONGAP 7*   , CBC   Recent Labs   Lab 10/20/23  1324   WBC 6.99   HGB 10.3*   HCT 32.3*   *   , INR No results for input(s): "INR", "PROTIME" in the last 48 hours., Lipid Panel No results for input(s): "CHOL", "HDL", "LDLCALC", "TRIG", "CHOLHDL" in the last 48 hours., Troponin No results for input(s): "TROPONINI" in the last 48 hours., and All pertinent lab results from the last 24 hours have been reviewed.    Significant Imaging: Echocardiogram: Transthoracic echo (TTE) complete (Cupid Only):   Results for orders placed or performed during the hospital encounter of 10/20/23   Echo   Result Value Ref Range    BSA 1.99 m2    LVOT stroke volume 82.65 cm3    LVIDd 5.25 3.5 - 6.0 cm    LV Systolic Volume 47.56 mL    LV Systolic Volume Index 24.6 mL/m2    LVIDs 3.40 2.1 - 4.0 cm    LV Diastolic Volume 132.51 mL    LV Diastolic Volume Index 68.66 mL/m2    IVS 1.49 (A) 0.6 - 1.1 cm    LVOT diameter 2.04 cm    LVOT area 3.3 cm2    FS 35 28 - 44 %    Left Ventricle Relative Wall Thickness 0.48 cm    Posterior Wall 1.25 (A) 0.6 - 1.1 cm    LV mass 304.60 g    LV Mass Index 158 g/m2    MV Peak E Hector 0.61 m/s    TDI LATERAL 0.07 m/s    TDI SEPTAL 0.06 m/s    E/E' ratio 9.38 m/s    MV Peak A Hector 0.97 m/s    TR Max Hector 2.62 m/s    E/A ratio 0.63     IVRT 121.79 msec    E wave deceleration time 318.63 msec    LV SEPTAL E/E' RATIO 10.17 m/s    LV LATERAL E/E' RATIO 8.71 m/s    PV Peak S Hector 0.62 m/s    PV Peak D Hector 0.21 m/s    Pulm vein S/D ratio 2.95     LVOT peak hector 1.11 m/s    " Left Ventricular Outflow Tract Mean Velocity 0.77 cm/s    Left Ventricular Outflow Tract Mean Gradient 2.63 mmHg    LA size 4.73 cm    Left Atrium Minor Axis 5.67 cm    Left Atrium Major Axis 6.39 cm    RVDD 3.63 cm    RV S' 18.23 cm/s    TAPSE 2.60 cm    RA Major Axis 6.22 cm    AV mean gradient 6 mmHg    AV peak gradient 11 mmHg    Ao peak tab 1.63 m/s    Ao VTI 35.80 cm    LVOT peak VTI 25.30 cm    AV valve area 2.31 cm²    AV Velocity Ratio 0.68     AV index (prosthetic) 0.71     CHINO by Velocity Ratio 2.22 cm²    MV mean gradient 1 mmHg    MV peak gradient 4 mmHg    MV stenosis pressure 1/2 time 92.40 ms    MV valve area p 1/2 method 2.38 cm2    MV valve area by continuity eq 2.78 cm2    MV VTI 29.7 cm    Triscuspid Valve Regurgitation Peak Gradient 27 mmHg    PV PEAK VELOCITY 1.23 m/s    PV peak gradient 6 mmHg    Sinus 3.22 cm    STJ 2.35 cm    Ascending aorta 3.18 cm    IVC diameter 2.30 cm    Mean e' 0.07 m/s    ZLVIDS 0.09     ZLVIDD -0.40     LA Volume Index 60.1 mL/m2    LA volume 115.95 cm3    LA WIDTH 4.8 cm    RA Width 4.0 cm    TV resting pulmonary artery pressure 35 mmHg    RV TB RVSP 11 mmHg    Est. RA pres 8 mmHg    Narrative      Left Ventricle: The left ventricle is normal in size. Mildly increased   wall thickness. Normal wall motion. There is normal systolic function with   a visually estimated ejection fraction of 60 - 65%. Grade I diastolic   dysfunction.    Left Atrium: Left atrium is severely dilated.    Right Ventricle: Normal right ventricular cavity size. Systolic   function is normal.    Right Atrium: Right atrium is mildly dilated.    Pulmonary Artery: The estimated pulmonary artery systolic pressure is   35 mmHg.    IVC/SVC: Intermediate venous pressure at 8 mmHg.       Assessment and Plan:     * Bradycardia  Bradycardia.  Monitor on tele to rule out any high-grade AV blocks.  When patient is sensorium comes back to normal, will walk around the floor to see if her heart rate  goes up on walking.  Avoid AV maury blocking agents.    CKD (chronic kidney disease)        Dementia         Essential hypertension            VTE Risk Mitigation (From admission, onward)         Ordered     enoxaparin injection 30 mg  Daily         10/20/23 1442     IP VTE HIGH RISK PATIENT  Once         10/20/23 1442     Place sequential compression device  Until discontinued         10/20/23 1442                Thank you for your consult. I will follow-up with patient. Please contact us if you have any additional questions.    Abdiel Apodaca MD  Cardiology   [facility] [place]

## 2023-10-21 VITALS
BODY MASS INDEX: 32.95 KG/M2 | HEIGHT: 64 IN | TEMPERATURE: 99 F | DIASTOLIC BLOOD PRESSURE: 65 MMHG | SYSTOLIC BLOOD PRESSURE: 138 MMHG | OXYGEN SATURATION: 100 % | HEART RATE: 80 BPM | RESPIRATION RATE: 18 BRPM | WEIGHT: 193 LBS

## 2023-10-21 LAB
ANION GAP SERPL CALC-SCNC: 9 MMOL/L (ref 8–16)
BUN SERPL-MCNC: 33 MG/DL (ref 8–23)
CALCIUM SERPL-MCNC: 9.2 MG/DL (ref 8.7–10.5)
CHLORIDE SERPL-SCNC: 113 MMOL/L (ref 95–110)
CO2 SERPL-SCNC: 24 MMOL/L (ref 23–29)
CREAT SERPL-MCNC: 2 MG/DL (ref 0.5–1.4)
EST. GFR  (NO RACE VARIABLE): 27 ML/MIN/1.73 M^2
GLUCOSE SERPL-MCNC: 78 MG/DL (ref 70–110)
POTASSIUM SERPL-SCNC: 3.9 MMOL/L (ref 3.5–5.1)
SODIUM SERPL-SCNC: 146 MMOL/L (ref 136–145)

## 2023-10-21 PROCEDURE — 96372 THER/PROPH/DIAG INJ SC/IM: CPT | Performed by: EMERGENCY MEDICINE

## 2023-10-21 PROCEDURE — 63600175 PHARM REV CODE 636 W HCPCS: Performed by: EMERGENCY MEDICINE

## 2023-10-21 PROCEDURE — 25000003 PHARM REV CODE 250: Performed by: HOSPITALIST

## 2023-10-21 PROCEDURE — 99213 OFFICE O/P EST LOW 20 MIN: CPT | Mod: ,,, | Performed by: INTERNAL MEDICINE

## 2023-10-21 PROCEDURE — G0378 HOSPITAL OBSERVATION PER HR: HCPCS

## 2023-10-21 PROCEDURE — 99213 PR OFFICE/OUTPT VISIT, EST, LEVL III, 20-29 MIN: ICD-10-PCS | Mod: ,,, | Performed by: INTERNAL MEDICINE

## 2023-10-21 PROCEDURE — 96374 THER/PROPH/DIAG INJ IV PUSH: CPT

## 2023-10-21 PROCEDURE — 80048 BASIC METABOLIC PNL TOTAL CA: CPT | Performed by: HOSPITALIST

## 2023-10-21 RX ORDER — OLANZAPINE 5 MG/1
5 TABLET, ORALLY DISINTEGRATING ORAL NIGHTLY
Status: DISCONTINUED | OUTPATIENT
Start: 2023-10-21 | End: 2023-10-21 | Stop reason: HOSPADM

## 2023-10-21 RX ORDER — OLANZAPINE 2.5 MG/1
5 TABLET ORAL ONCE AS NEEDED
Status: COMPLETED | OUTPATIENT
Start: 2023-10-21 | End: 2023-10-21

## 2023-10-21 RX ORDER — LORAZEPAM 2 MG/ML
1 INJECTION INTRAMUSCULAR
Status: COMPLETED | OUTPATIENT
Start: 2023-10-21 | End: 2023-10-21

## 2023-10-21 RX ORDER — LORAZEPAM 2 MG/ML
2 INJECTION INTRAMUSCULAR
Status: COMPLETED | OUTPATIENT
Start: 2023-10-21 | End: 2023-10-21

## 2023-10-21 RX ADMIN — ATORVASTATIN CALCIUM 20 MG: 10 TABLET, FILM COATED ORAL at 08:10

## 2023-10-21 RX ADMIN — OLANZAPINE 5 MG: 2.5 TABLET, FILM COATED ORAL at 12:10

## 2023-10-21 RX ADMIN — LORAZEPAM 2 MG: 2 INJECTION INTRAMUSCULAR; INTRAVENOUS at 07:10

## 2023-10-21 RX ADMIN — LORAZEPAM 1 MG: 2 INJECTION INTRAMUSCULAR; INTRAVENOUS at 11:10

## 2023-10-21 RX ADMIN — VALSARTAN 160 MG: 80 TABLET, FILM COATED ORAL at 08:10

## 2023-10-21 NOTE — ASSESSMENT & PLAN NOTE
Bradycardia.  Monitor on tele to rule out any high-grade AV blocks.  When patient is sensorium comes back to normal, will walk around the floor to see if her heart rate goes up on walking.  Avoid AV maury blocking agents.    10/21:  Resolved.  No high-grade AV blocks noted on tele monitoring.  Now heart rate between 50-60 beats per minute.  Asymptomatic.  No further cardiac evaluation needed.  Consider event monitor as an outpatient if clinically indicated.  Avoid AV maury blocking agents

## 2023-10-21 NOTE — PROGRESS NOTES
[facility] Emergency Dept  Cardiology  Progress Note    Patient Name: Tyra Zavala  MRN: [id number]  Admission Date: 10/20/2023  Hospital Length of Stay: 0 days  Code Status: Full Code   Attending Physician: Jero Thornton MD   Primary Care Physician: Jayesh Dolan MD  Expected Discharge Date: 10/21/2023  Principal Problem:Bradycardia    Subjective:       Interval History:  Patient with intermittent agitation.  Now resolved after medication.  Denies any chest pains at rest on exertion, orthopnea, PND.  Heart rate between 50 and 60 beats per minute.    Past Medical History:   Diagnosis Date    CKD (chronic kidney disease)     Dementia     HLD (hyperlipidemia)     Hyperkalemia     Hypernatremia     Hypertension        Past Surgical History:   Procedure Laterality Date    APPENDECTOMY         Review of patient's allergies indicates:  No Known Allergies    No current facility-administered medications on file prior to encounter.     Current Outpatient Medications on File Prior to Encounter   Medication Sig    atorvastatin (LIPITOR) 20 MG tablet     hydroCHLOROthiazide (HYDRODIURIL) 25 MG tablet Take 25 mg by mouth.    OLANZapine (ZYPREXA) 2.5 MG tablet Take 2.5 mg by mouth every evening.    [DISCONTINUED] diphenhydrAMINE (BENADRYL) 50 mg/mL injection Inject 50 mg into the muscle every 6 (six) hours as needed for Itching (agitation).    [DISCONTINUED] donepeziL (ARICEPT) 5 MG tablet Take 5 mg by mouth every evening.    [DISCONTINUED] haloperidol lactate (HALDOL) 5 mg/mL injection Inject 5 mg into the muscle every 6 (six) hours as needed (agitation).    [DISCONTINUED] insulin lispro 100 unit/mL injection Inject 0-12 Units into the skin 3 (three) times daily before meals. TID before meals and at bedtime  Sliding Scale:  0-60= 0 units  = 0 units  201-250= 4 units  251-300= 6 units  301-350= 8 units  351-400= 10 units  >401 CALL MD= 12 units    [DISCONTINUED] LORazepam (ATIVAN) 2 mg/mL  injection Inject 2 mg into the muscle every 6 (six) hours as needed (agitation).    [DISCONTINUED] losartan (COZAAR) 100 MG tablet Take 100 mg by mouth once daily.    [DISCONTINUED] memantine (NAMENDA) 5 MG Tab Take 1 tablet (5 mg total) by mouth 2 (two) times daily.    [DISCONTINUED] traZODone (DESYREL) 50 MG tablet Take 50 mg by mouth every evening.    valsartan (DIOVAN) 160 MG tablet Take 160 mg by mouth once daily.    [DISCONTINUED] acetaminophen (TYLENOL) 500 MG tablet Take 2 tablets (1,000 mg total) by mouth every 8 (eight) hours as needed for Pain.    [DISCONTINUED] LORazepam (ATIVAN) 0.5 MG tablet One po q 8 hr prn agitation or insomnia. Not intended as daily medication. Do not drive (even bike) or perform dangerous tasks while taking this medication     Family History    None       Tobacco Use    Smoking status: Never    Smokeless tobacco: Never   Substance and Sexual Activity    Alcohol use: Yes     Comment: occasional    Drug use: Never    Sexual activity: Not on file     Review of Systems   Unable to perform ROS: Mental status change     Objective:     Vital Signs (Most Recent):  Temp: 98.9 °F (37.2 °C) (10/21/23 1213)  Pulse: (!) 54 (10/21/23 1213)  Resp: 18 (10/21/23 1213)  BP: (!) 145/66 (10/21/23 1213)  SpO2: 100 % (10/21/23 1213) Vital Signs (24h Range):  Temp:  [97.8 °F (36.6 °C)-99 °F (37.2 °C)] 98.9 °F (37.2 °C)  Pulse:  [41-65] 54  Resp:  [9-19] 18  SpO2:  [98 %-100 %] 100 %  BP: (119-178)/() 145/66     Weight: 87.5 kg (193 lb)  Body mass index is 33.13 kg/m².    SpO2: 100 %       No intake or output data in the 24 hours ending 10/21/23 1312    Lines/Drains/Airways       Drain  Duration             Female External Urinary Catheter 10/20/23 1 day              Peripheral Intravenous Line  Duration                  Peripheral IV - Single Lumen 10/20/23 1140 20 G Left Antecubital 1 day                     Physical Exam  Constitutional:       Appearance: Normal appearance. She is  "well-developed.   HENT:      Head: Normocephalic.   Eyes:      Pupils: Pupils are equal, round, and reactive to light.   Cardiovascular:      Rate and Rhythm: Regular rhythm. Bradycardia present.   Pulmonary:      Effort: Pulmonary effort is normal.      Breath sounds: Normal breath sounds.   Abdominal:      General: Bowel sounds are normal.      Palpations: Abdomen is soft.      Tenderness: There is no abdominal tenderness.   Musculoskeletal:         General: Normal range of motion.      Cervical back: Normal range of motion and neck supple.   Skin:     General: Skin is warm.   Neurological:      Mental Status: She is alert. She is disoriented.          Significant Labs: BMP:   Recent Labs   Lab 10/20/23  1324 10/21/23  0809   GLU 80 78    146*   K 4.5 3.9   * 113*   CO2 26 24   BUN 30* 33*   CREATININE 1.9* 2.0*   CALCIUM 9.4 9.2   MG 2.0  --      , CMP   Recent Labs   Lab 10/20/23  1324 10/21/23  0809    146*   K 4.5 3.9   * 113*   CO2 26 24   GLU 80 78   BUN 30* 33*   CREATININE 1.9* 2.0*   CALCIUM 9.4 9.2   PROT 7.1  --    ALBUMIN 3.7  --    BILITOT 0.4  --    ALKPHOS 77  --    AST 21  --    ALT 11  --    ANIONGAP 7* 9     , CBC   Recent Labs   Lab 10/20/23  1324   WBC 6.99   HGB 10.3*   HCT 32.3*   *     , INR No results for input(s): "INR", "PROTIME" in the last 48 hours., Lipid Panel No results for input(s): "CHOL", "HDL", "LDLCALC", "TRIG", "CHOLHDL" in the last 48 hours., Troponin No results for input(s): "TROPONINI" in the last 48 hours., and All pertinent lab results from the last 24 hours have been reviewed.    Significant Imaging: Echocardiogram: Transthoracic echo (TTE) complete (Cupid Only):   Results for orders placed or performed during the hospital encounter of 10/20/23   Echo   Result Value Ref Range    BSA 1.99 m2    LVOT stroke volume 82.65 cm3    LVIDd 5.25 3.5 - 6.0 cm    LV Systolic Volume 47.56 mL    LV Systolic Volume Index 24.6 mL/m2    LVIDs 3.40 2.1 - 4.0 " cm    LV Diastolic Volume 132.51 mL    LV Diastolic Volume Index 68.66 mL/m2    IVS 1.49 (A) 0.6 - 1.1 cm    LVOT diameter 2.04 cm    LVOT area 3.3 cm2    FS 35 28 - 44 %    Left Ventricle Relative Wall Thickness 0.48 cm    Posterior Wall 1.25 (A) 0.6 - 1.1 cm    LV mass 304.60 g    LV Mass Index 158 g/m2    MV Peak E Hector 0.61 m/s    TDI LATERAL 0.07 m/s    TDI SEPTAL 0.06 m/s    E/E' ratio 9.38 m/s    MV Peak A Hector 0.97 m/s    TR Max Hector 2.62 m/s    E/A ratio 0.63     IVRT 121.79 msec    E wave deceleration time 318.63 msec    LV SEPTAL E/E' RATIO 10.17 m/s    LV LATERAL E/E' RATIO 8.71 m/s    PV Peak S Hector 0.62 m/s    PV Peak D Hector 0.21 m/s    Pulm vein S/D ratio 2.95     LVOT peak hector 1.11 m/s    Left Ventricular Outflow Tract Mean Velocity 0.77 cm/s    Left Ventricular Outflow Tract Mean Gradient 2.63 mmHg    LA size 4.73 cm    Left Atrium Minor Axis 5.67 cm    Left Atrium Major Axis 6.39 cm    RVDD 3.63 cm    RV S' 18.23 cm/s    TAPSE 2.60 cm    RA Major Axis 6.22 cm    AV mean gradient 6 mmHg    AV peak gradient 11 mmHg    Ao peak hector 1.63 m/s    Ao VTI 35.80 cm    LVOT peak VTI 25.30 cm    AV valve area 2.31 cm²    AV Velocity Ratio 0.68     AV index (prosthetic) 0.71     CHINO by Velocity Ratio 2.22 cm²    MV mean gradient 1 mmHg    MV peak gradient 4 mmHg    MV stenosis pressure 1/2 time 92.40 ms    MV valve area p 1/2 method 2.38 cm2    MV valve area by continuity eq 2.78 cm2    MV VTI 29.7 cm    Triscuspid Valve Regurgitation Peak Gradient 27 mmHg    PV PEAK VELOCITY 1.23 m/s    PV peak gradient 6 mmHg    Sinus 3.22 cm    STJ 2.35 cm    Ascending aorta 3.18 cm    IVC diameter 2.30 cm    Mean e' 0.07 m/s    ZLVIDS 0.09     ZLVIDD -0.40     LA Volume Index 60.1 mL/m2    LA volume 115.95 cm3    LA WIDTH 4.8 cm    RA Width 4.0 cm    TV resting pulmonary artery pressure 35 mmHg    RV TB RVSP 11 mmHg    Est. RA pres 8 mmHg    Narrative      Left Ventricle: The left ventricle is normal in size. Mildly increased    wall thickness. Normal wall motion. There is normal systolic function with   a visually estimated ejection fraction of 60 - 65%. Grade I diastolic   dysfunction.    Left Atrium: Left atrium is severely dilated.    Right Ventricle: Normal right ventricular cavity size. Systolic   function is normal.    Right Atrium: Right atrium is mildly dilated.    Pulmonary Artery: The estimated pulmonary artery systolic pressure is   35 mmHg.    IVC/SVC: Intermediate venous pressure at 8 mmHg.       Assessment and Plan:     Brief HPI:     * Bradycardia  Bradycardia.  Monitor on tele to rule out any high-grade AV blocks.  When patient is sensorium comes back to normal, will walk around the floor to see if her heart rate goes up on walking.  Avoid AV maury blocking agents.    10/21:  Resolved.  No high-grade AV blocks noted on tele monitoring.  Now heart rate between 50-60 beats per minute.  Asymptomatic.  No further cardiac evaluation needed.  Consider event monitor as an outpatient if clinically indicated.  Avoid AV maury blocking agents    CKD (chronic kidney disease)        Dementia         Essential hypertension            VTE Risk Mitigation (From admission, onward)         Ordered     enoxaparin injection 30 mg  Daily         10/20/23 1442     IP VTE HIGH RISK PATIENT  Once         10/20/23 1442     Place sequential compression device  Until discontinued         10/20/23 1442                Abdiel Apodaca MD  Cardiology  [facility] - [place] - Emergency Dept

## 2023-10-21 NOTE — PT/OT/SLP PROGRESS
Occupational Therapy      Patient Name:  Tyra Zavala   MRN:  [id number]    Patient not seen today secondary to pt will be discharging back to [facility]. Per nurse, pt is ambulatory and no in need of eval  . OT to sign off.     10/21/2023

## 2023-10-21 NOTE — SUBJECTIVE & OBJECTIVE
Interval History:  Patient with intermittent agitation.  Now resolved after medication.  Denies any chest pains at rest on exertion, orthopnea, PND.  Heart rate between 50 and 60 beats per minute.    Past Medical History:   Diagnosis Date    CKD (chronic kidney disease)     Dementia     HLD (hyperlipidemia)     Hyperkalemia     Hypernatremia     Hypertension        Past Surgical History:   Procedure Laterality Date    APPENDECTOMY         Review of patient's allergies indicates:  No Known Allergies    No current facility-administered medications on file prior to encounter.     Current Outpatient Medications on File Prior to Encounter   Medication Sig    atorvastatin (LIPITOR) 20 MG tablet     hydroCHLOROthiazide (HYDRODIURIL) 25 MG tablet Take 25 mg by mouth.    OLANZapine (ZYPREXA) 2.5 MG tablet Take 2.5 mg by mouth every evening.    [DISCONTINUED] diphenhydrAMINE (BENADRYL) 50 mg/mL injection Inject 50 mg into the muscle every 6 (six) hours as needed for Itching (agitation).    [DISCONTINUED] donepeziL (ARICEPT) 5 MG tablet Take 5 mg by mouth every evening.    [DISCONTINUED] haloperidol lactate (HALDOL) 5 mg/mL injection Inject 5 mg into the muscle every 6 (six) hours as needed (agitation).    [DISCONTINUED] insulin lispro 100 unit/mL injection Inject 0-12 Units into the skin 3 (three) times daily before meals. TID before meals and at bedtime  Sliding Scale:  0-60= 0 units  = 0 units  201-250= 4 units  251-300= 6 units  301-350= 8 units  351-400= 10 units  >401 CALL MD= 12 units    [DISCONTINUED] LORazepam (ATIVAN) 2 mg/mL injection Inject 2 mg into the muscle every 6 (six) hours as needed (agitation).    [DISCONTINUED] losartan (COZAAR) 100 MG tablet Take 100 mg by mouth once daily.    [DISCONTINUED] memantine (NAMENDA) 5 MG Tab Take 1 tablet (5 mg total) by mouth 2 (two) times daily.    [DISCONTINUED] traZODone (DESYREL) 50 MG tablet Take 50 mg by mouth every evening.    valsartan (DIOVAN) 160 MG tablet  Take 160 mg by mouth once daily.    [DISCONTINUED] acetaminophen (TYLENOL) 500 MG tablet Take 2 tablets (1,000 mg total) by mouth every 8 (eight) hours as needed for Pain.    [DISCONTINUED] LORazepam (ATIVAN) 0.5 MG tablet One po q 8 hr prn agitation or insomnia. Not intended as daily medication. Do not drive (even bike) or perform dangerous tasks while taking this medication     Family History    None       Tobacco Use    Smoking status: Never    Smokeless tobacco: Never   Substance and Sexual Activity    Alcohol use: Yes     Comment: occasional    Drug use: Never    Sexual activity: Not on file     Review of Systems   Unable to perform ROS: Mental status change     Objective:     Vital Signs (Most Recent):  Temp: 98.9 °F (37.2 °C) (10/21/23 1213)  Pulse: (!) 54 (10/21/23 1213)  Resp: 18 (10/21/23 1213)  BP: (!) 145/66 (10/21/23 1213)  SpO2: 100 % (10/21/23 1213) Vital Signs (24h Range):  Temp:  [97.8 °F (36.6 °C)-99 °F (37.2 °C)] 98.9 °F (37.2 °C)  Pulse:  [41-65] 54  Resp:  [9-19] 18  SpO2:  [98 %-100 %] 100 %  BP: (119-178)/() 145/66     Weight: 87.5 kg (193 lb)  Body mass index is 33.13 kg/m².    SpO2: 100 %       No intake or output data in the 24 hours ending 10/21/23 1312    Lines/Drains/Airways       Drain  Duration             Female External Urinary Catheter 10/20/23 1 day              Peripheral Intravenous Line  Duration                  Peripheral IV - Single Lumen 10/20/23 1140 20 G Left Antecubital 1 day                     Physical Exam  Constitutional:       Appearance: Normal appearance. She is well-developed.   HENT:      Head: Normocephalic.   Eyes:      Pupils: Pupils are equal, round, and reactive to light.   Cardiovascular:      Rate and Rhythm: Regular rhythm. Bradycardia present.   Pulmonary:      Effort: Pulmonary effort is normal.      Breath sounds: Normal breath sounds.   Abdominal:      General: Bowel sounds are normal.      Palpations: Abdomen is soft.      Tenderness: There is  "no abdominal tenderness.   Musculoskeletal:         General: Normal range of motion.      Cervical back: Normal range of motion and neck supple.   Skin:     General: Skin is warm.   Neurological:      Mental Status: She is alert. She is disoriented.          Significant Labs: BMP:   Recent Labs   Lab 10/20/23  1324 10/21/23  0809   GLU 80 78    146*   K 4.5 3.9   * 113*   CO2 26 24   BUN 30* 33*   CREATININE 1.9* 2.0*   CALCIUM 9.4 9.2   MG 2.0  --      , CMP   Recent Labs   Lab 10/20/23  1324 10/21/23  0809    146*   K 4.5 3.9   * 113*   CO2 26 24   GLU 80 78   BUN 30* 33*   CREATININE 1.9* 2.0*   CALCIUM 9.4 9.2   PROT 7.1  --    ALBUMIN 3.7  --    BILITOT 0.4  --    ALKPHOS 77  --    AST 21  --    ALT 11  --    ANIONGAP 7* 9     , CBC   Recent Labs   Lab 10/20/23  1324   WBC 6.99   HGB 10.3*   HCT 32.3*   *     , INR No results for input(s): "INR", "PROTIME" in the last 48 hours., Lipid Panel No results for input(s): "CHOL", "HDL", "LDLCALC", "TRIG", "CHOLHDL" in the last 48 hours., Troponin No results for input(s): "TROPONINI" in the last 48 hours., and All pertinent lab results from the last 24 hours have been reviewed.    Significant Imaging: Echocardiogram: Transthoracic echo (TTE) complete (Cupid Only):   Results for orders placed or performed during the hospital encounter of 10/20/23   Echo   Result Value Ref Range    BSA 1.99 m2    LVOT stroke volume 82.65 cm3    LVIDd 5.25 3.5 - 6.0 cm    LV Systolic Volume 47.56 mL    LV Systolic Volume Index 24.6 mL/m2    LVIDs 3.40 2.1 - 4.0 cm    LV Diastolic Volume 132.51 mL    LV Diastolic Volume Index 68.66 mL/m2    IVS 1.49 (A) 0.6 - 1.1 cm    LVOT diameter 2.04 cm    LVOT area 3.3 cm2    FS 35 28 - 44 %    Left Ventricle Relative Wall Thickness 0.48 cm    Posterior Wall 1.25 (A) 0.6 - 1.1 cm    LV mass 304.60 g    LV Mass Index 158 g/m2    MV Peak E Hector 0.61 m/s    TDI LATERAL 0.07 m/s    TDI SEPTAL 0.06 m/s    E/E' ratio 9.38 m/s "    MV Peak A Hector 0.97 m/s    TR Max Hector 2.62 m/s    E/A ratio 0.63     IVRT 121.79 msec    E wave deceleration time 318.63 msec    LV SEPTAL E/E' RATIO 10.17 m/s    LV LATERAL E/E' RATIO 8.71 m/s    PV Peak S Hector 0.62 m/s    PV Peak D Hector 0.21 m/s    Pulm vein S/D ratio 2.95     LVOT peak hector 1.11 m/s    Left Ventricular Outflow Tract Mean Velocity 0.77 cm/s    Left Ventricular Outflow Tract Mean Gradient 2.63 mmHg    LA size 4.73 cm    Left Atrium Minor Axis 5.67 cm    Left Atrium Major Axis 6.39 cm    RVDD 3.63 cm    RV S' 18.23 cm/s    TAPSE 2.60 cm    RA Major Axis 6.22 cm    AV mean gradient 6 mmHg    AV peak gradient 11 mmHg    Ao peak hector 1.63 m/s    Ao VTI 35.80 cm    LVOT peak VTI 25.30 cm    AV valve area 2.31 cm²    AV Velocity Ratio 0.68     AV index (prosthetic) 0.71     CHINO by Velocity Ratio 2.22 cm²    MV mean gradient 1 mmHg    MV peak gradient 4 mmHg    MV stenosis pressure 1/2 time 92.40 ms    MV valve area p 1/2 method 2.38 cm2    MV valve area by continuity eq 2.78 cm2    MV VTI 29.7 cm    Triscuspid Valve Regurgitation Peak Gradient 27 mmHg    PV PEAK VELOCITY 1.23 m/s    PV peak gradient 6 mmHg    Sinus 3.22 cm    STJ 2.35 cm    Ascending aorta 3.18 cm    IVC diameter 2.30 cm    Mean e' 0.07 m/s    ZLVIDS 0.09     ZLVIDD -0.40     LA Volume Index 60.1 mL/m2    LA volume 115.95 cm3    LA WIDTH 4.8 cm    RA Width 4.0 cm    TV resting pulmonary artery pressure 35 mmHg    RV TB RVSP 11 mmHg    Est. RA pres 8 mmHg    Narrative      Left Ventricle: The left ventricle is normal in size. Mildly increased   wall thickness. Normal wall motion. There is normal systolic function with   a visually estimated ejection fraction of 60 - 65%. Grade I diastolic   dysfunction.    Left Atrium: Left atrium is severely dilated.    Right Ventricle: Normal right ventricular cavity size. Systolic   function is normal.    Right Atrium: Right atrium is mildly dilated.    Pulmonary Artery: The estimated pulmonary artery  systolic pressure is   35 mmHg.    IVC/SVC: Intermediate venous pressure at 8 mmHg.

## 2023-10-21 NOTE — NURSING
Pt to be discharged back to [facility] instead of transfer to floor. Report called to Ju at [facility]. All questions answered. Transport arranged. PIV to be removed once transport arrives.

## 2023-10-21 NOTE — DISCHARGE INSTRUCTIONS
We are glad you are feeling better and able to be discharged.  On admit your heart rate was low.  We believe this could be secondary to donepezil and memantine and recommend these be discontinued.  Take all medications as prescribed.  Eat a strict low salt cardiac diabetic and renal diet.  Follow up with your physicians as scheduled - pcp within 1 week and cardiology within 1 month.  Thank you for trusting [facility] and Dr. Thornton with your care.  We are honored that you entrusted us with your healthcare needs. Your satisfaction is very important to us and we hope you have been very pleased with your experience at [facility]. After your discharge you may receive a survey asking you to rate your hospital experience. We encourage you to take the time to complete the survey as your feedback allows us to identify areas for improvement as well as recognize our staff.   We hope that you have received the very best care possible during your hospitalization at [facility], as your satisfaction is our top priority.

## 2023-10-21 NOTE — SUBJECTIVE & OBJECTIVE
Past Medical History:   Diagnosis Date    CKD (chronic kidney disease)     Dementia     HLD (hyperlipidemia)     Hyperkalemia     Hypernatremia     Hypertension        Past Surgical History:   Procedure Laterality Date    APPENDECTOMY         Review of patient's allergies indicates:  No Known Allergies    No current facility-administered medications on file prior to encounter.     Current Outpatient Medications on File Prior to Encounter   Medication Sig    atorvastatin (LIPITOR) 20 MG tablet     diphenhydrAMINE (BENADRYL) 50 mg/mL injection Inject 50 mg into the muscle every 6 (six) hours as needed for Itching (agitation).    donepeziL (ARICEPT) 5 MG tablet Take 5 mg by mouth every evening.    haloperidol lactate (HALDOL) 5 mg/mL injection Inject 5 mg into the muscle every 6 (six) hours as needed (agitation).    hydroCHLOROthiazide (HYDRODIURIL) 25 MG tablet Take 25 mg by mouth.    insulin lispro 100 unit/mL injection Inject 0-12 Units into the skin 3 (three) times daily before meals. TID before meals and at bedtime  Sliding Scale:  0-60= 0 units  = 0 units  201-250= 4 units  251-300= 6 units  301-350= 8 units  351-400= 10 units  >401 CALL MD= 12 units    LORazepam (ATIVAN) 2 mg/mL injection Inject 2 mg into the muscle every 6 (six) hours as needed (agitation).    losartan (COZAAR) 100 MG tablet Take 100 mg by mouth once daily.    memantine (NAMENDA) 5 MG Tab Take 1 tablet (5 mg total) by mouth 2 (two) times daily.    OLANZapine (ZYPREXA) 2.5 MG tablet Take 2.5 mg by mouth every evening.    traZODone (DESYREL) 50 MG tablet Take 50 mg by mouth every evening.    acetaminophen (TYLENOL) 500 MG tablet Take 2 tablets (1,000 mg total) by mouth every 8 (eight) hours as needed for Pain.    LORazepam (ATIVAN) 0.5 MG tablet One po q 8 hr prn agitation or insomnia. Not intended as daily medication. Do not drive (even bike) or perform dangerous tasks while taking this medication    valsartan (DIOVAN) 160 MG tablet Take  160 mg by mouth once daily.     Family History    None       Tobacco Use    Smoking status: Never    Smokeless tobacco: Never   Substance and Sexual Activity    Alcohol use: Yes     Comment: occasional    Drug use: Never    Sexual activity: Not on file     Review of Systems   Unable to perform ROS: Dementia     Objective:     Vital Signs (Most Recent):  Temp: 97.8 °F (36.6 °C) (10/20/23 1554)  Pulse: (!) 47 (10/20/23 1937)  Resp: (!) 9 (10/20/23 1937)  BP: (!) 149/68 (10/20/23 1937)  SpO2: 99 % (10/20/23 1937) Vital Signs (24h Range):  Temp:  [97.7 °F (36.5 °C)-97.8 °F (36.6 °C)] 97.8 °F (36.6 °C)  Pulse:  [46-65] 47  Resp:  [9-20] 9  SpO2:  [98 %-100 %] 99 %  BP: (138-178)/(65-95) 149/68     Weight: 87.5 kg (193 lb)  Body mass index is 33.13 kg/m².     Physical Exam  Vitals and nursing note reviewed.   Constitutional:       General: She is not in acute distress.     Appearance: She is well-developed.   HENT:      Head: Normocephalic and atraumatic.      Right Ear: External ear normal.      Left Ear: External ear normal.      Nose: Nose normal.   Eyes:      Conjunctiva/sclera: Conjunctivae normal.   Cardiovascular:      Rate and Rhythm: Normal rate and regular rhythm.   Pulmonary:      Effort: Pulmonary effort is normal. No respiratory distress.   Abdominal:      General: There is no distension.      Palpations: Abdomen is soft.   Musculoskeletal:         General: Normal range of motion.   Skin:     General: Skin is warm and dry.   Neurological:      Mental Status: She is lethargic.   Psychiatric:         Behavior: Behavior is slowed and withdrawn.         Thought Content: Thought content normal.         Cognition and Memory: Cognition is impaired. Memory is impaired.                Significant Labs: All pertinent labs within the past 24 hours have been reviewed.    Significant Imaging: I have reviewed all pertinent imaging results/findings within the past 24 hours.

## 2023-10-21 NOTE — HPI
68 y.o. female with hypertension, hyperlipidemia, CKD, and dementia sent from [facility] for evaluation of low heart rate.  Reportedly received Zyprexa and Ativan last night.  Was found to be drowsy this morning with a low heart rate.  EMS was called and patient transported to ED. history limited secondary to patient mental status.  In the ED, overall stable without acute abnormality on workup.  Heart rate was noted to be 40-60.  Cardiology was consulted with recommendation for observation.

## 2023-10-21 NOTE — NURSING
[facility]  Nurses Note -- 4 Eyes      10/21/2023       Skin assessed on: Q Shift      [x] No Pressure Injuries Present    []Prevention Measures Documented    [] Yes LDA  for Pressure Injury Previously documented     [] Yes New Pressure Injury Discovered   [] LDA for New Pressure Injury Added      Attending RN:  Kristla Tavares RN     Second RN:  ELVER Knutson

## 2023-10-21 NOTE — PLAN OF CARE
Message sent to nurse, April with call report info and address of facility.       10/21/23 1235   Final Note   Assessment Type Final Discharge Note   Anticipated Discharge Disposition Psych   Post-Acute Status   Post-Acute Authorization Placement   Post-Acute Placement Status Set-up Complete/Auth obtained  (Patient returning to [facility])   Discharge Delays (!) PFC Arranged Transportation

## 2023-10-21 NOTE — ED NOTES
Pt awake and alert. Family at bedside. Pt oriented to self. Pt has no complaints of pain at this time. Sitter at bedside.

## 2023-10-21 NOTE — CONSULTS
"[facility]  Psychiatry  Telepsychiatry Consult Note    Please see previous notes:    Patient agreeable to consultation via telepsychiatry.    Tele-Consultation from Psychiatry started: 10/20/2023 at 8:00pm  The chief complaint leading to psychiatric consultation is: SI  This consultation was requested by Dr Saavedra, the Emergency Department attending physician.  The location of the consulting psychiatrist is  [place] .  The patient location is  [facility] EMERGENCY DEPARTMENT   The patient arrived at the ED at: [facility]    Also present with the patient at the time of the consultation: nobody    Patient Identification:   Tyra Zavala is a 68 y.o. female.    Patient information was obtained from patient and past medical records.  Patient presented to the Emergency Department     Consults  Teleconsult Time Documentation  Subjective:     History of Present Illness:  67yo F with hx of anxiety and dementia presents [facility]EC from [facility] for medical w/u of bradycardia.     Per ED note-  "    Arrives viam ems from [facility] with c/o bradycardia. Ems reports p was found by staff to have HR in 30s and hard to arouse. Reports pt received Zyprexa and ativan at 2230 last night. Ems reports HR in 40s, states giving 1mg atropine. Per ems pt baseline is AAOx1. Pt currently drowsy but oriented to self. Hx dementia.       Tyra Zavala is a 68 y.o. female, with a past medical history of dementia and HTN, who presents to the ED via EMS from [facility] with bradycardia that began today. Per independent historian, EMS, patient was found by [facility] staff with HR in 30s and hard to arouse. Patient was given Zyprexa and ativan at 10:30 pm last night. EMS reports HR in 40s and gave 1 mg atropine. Per EMS, baseline is AAOx1. Patient reports associated symptoms of rhinorrhea and cough for 3 days. Patient denies chest pain, fever, chills, abdominal pain, nausea, vomiting, diarrhea, dysuria, headaches, sore throat, arm or leg trouble, eye pain, ear " "pain, rash, or other associated symptoms. NKDA.   "    On interview, patient reports "I am feeling good... " Denied any problems with depression, SI, or HI. No psychotic sx noted. Confabulated on interview.   This is the extent of patients complaints at this time  12 pt ros was negative aside from sx noted above    Son at bedside reports she was in the hospital for dementia. She was there for 2 weeks. He called his sister who reported patient was there for having an incident while in a nursing facility in [facility], was refusing her meds and getting agitated.  She was then sent to the medical hospital and transferred to inpatient psychiatry. Family reports she was supposed to be discharged soon from [facility]. They reports this is the first time she was admitted to psych facility.  This is the extent of patients complaints at this time  12 pt ros was negative aside from sx noted above    Psychiatric History:   Previous Psychiatric Hospitalizations: Yes , [facility] x [facility] in October 2023  Previous Medication Trials: No   Previous Suicide Attempts: no   History of Violence: no  History of Depression: no  History of Silvana: no  History of Auditory/Visual Hallucination no  History of Delusions: no  Outpatient psychiatrist (current & past): No    Substance Abuse History:  Tobacco:No  Alcohol: No  Illicit Substances:No  Detox/Rehab: No    Legal History: Past charges/incarcerations: No     Family Psychiatric History: reviewed as noncontributory      Social History:  Lives in the nursing home. Good social support. Denied access to firearms.    Psychiatric Mental Status Exam:  Arousal: sleepy  Sensorium/Orientation: oriented to person, situation  Behavior/Cooperation: cooperative   Speech: slowed, delayed  Language: not tested  Mood: " fine "   Affect: appropriate  Thought Process: normal and logical, poverty of thought  Thought Content:   Auditory hallucinations: NO  Visual hallucinations: NO  Paranoia: NO  Delusions:  " NO  Suicidal ideation: NO  Homicidal ideation: NO  Attention/Concentration:  impaired  Memory:    Recent:  Decreased   Remote: Intact    Fund of Knowledge: Impaired   Abstract reasoning: similarities were concrete  Insight: poor awareness of illness  Judgment:  fair      Past Medical History:   Past Medical History:   Diagnosis Date    CKD (chronic kidney disease)     Dementia     HLD (hyperlipidemia)     Hyperkalemia     Hypernatremia     Hypertension       Laboratory Data:   Labs Reviewed   COMPREHENSIVE METABOLIC PANEL - Abnormal; Notable for the following components:       Result Value    Chloride 112 (*)     BUN 30 (*)     Creatinine 1.9 (*)     eGFR 28 (*)     Anion Gap 7 (*)     All other components within normal limits   CBC W/ AUTO DIFFERENTIAL - Abnormal; Notable for the following components:    RBC 3.85 (*)     Hemoglobin 10.3 (*)     Hematocrit 32.3 (*)     MCH 26.8 (*)     MCHC 31.9 (*)     Platelets 148 (*)     Gran % 75.1 (*)     All other components within normal limits   URINALYSIS, REFLEX TO URINE CULTURE - Abnormal; Notable for the following components:    Color, UA Colorless (*)     All other components within normal limits    Narrative:     Specimen Source->Urine   MAGNESIUM   TSH   DRUG SCREEN PANEL, URINE EMERGENCY    Narrative:     Specimen Source->Urine           Allergies:   Review of patient's allergies indicates:  No Known Allergies    Medications in ER:   Medications   atorvastatin tablet 20 mg (has no administration in time range)   valsartan tablet 160 mg (has no administration in time range)   sodium chloride 0.9% flush 10 mL (has no administration in time range)   melatonin tablet 6 mg (has no administration in time range)   ondansetron injection 4 mg (has no administration in time range)   prochlorperazine injection Soln 5 mg (has no administration in time range)   polyethylene glycol packet 17 g (17 g Oral Not Given 10/20/23 7787)   acetaminophen tablet 650 mg (has no  administration in time range)   simethicone chewable tablet 80 mg (has no administration in time range)   aluminum-magnesium hydroxide-simethicone 200-200-20 mg/5 mL suspension 30 mL (has no administration in time range)   naloxone 0.4 mg/mL injection 0.02 mg (has no administration in time range)   glucose chewable tablet 16 g (has no administration in time range)   glucose chewable tablet 24 g (has no administration in time range)   glucagon (human recombinant) injection 1 mg (has no administration in time range)   enoxaparin injection 30 mg (30 mg Subcutaneous Given 10/20/23 1723)   dextrose 10% bolus 125 mL 125 mL (has no administration in time range)   dextrose 10% bolus 250 mL 250 mL (has no administration in time range)   donepeziL tablet 5 mg (has no administration in time range)   memantine tablet 5 mg (has no administration in time range)   traZODone tablet 50 mg (has no administration in time range)       Medications at home: reviewed in MAR    No new subjective & objective note has been filed under this hospital service since the last note was generated.      Assessment - Diagnosis - Goals:     Diagnosis/Impression:   Dementia with behavioral Disturbance    Rec:   Continue PEC/CEC for grave disability. Should she remain without aggression /SI/HI in hospital then may consider rescinding prior to discharge.   1:1 sitter  Hold antipsychotic, benzodiazepines, trazodone  for now given bradycardia  Try to redirect any agitation with verbal redirection. Try to have family at bedside as they can redirect patient.     Time with patient , coordinating care, speaking with family: 45min      More than 50% of the time was spent counseling/coordinating care    Consulting clinician was informed of the encounter and consult note.    Consultation ended: 10/20/2023 at 8:45pm    Jeramy Zavala MD  Psychiatry  [facility]

## 2023-10-21 NOTE — PT/OT/SLP PROGRESS
Physical Therapy      Patient Name:  Tyra Zavala   MRN:  [id number]    Patient not seen today secondary to pt ambulated with nurse, April. Nurse reports no skilled PT needs at this time, safe for discharge per  MD order.

## 2023-10-21 NOTE — ED NOTES
Pt up out of bed, angry agitated. Pt pulling equipment off walls, pulling monitors off herself. Pt verbally aggressive with staff. Pt confused and believes that we are in her house. Pt also seeing people that aren't there. Pt pulled all her clothes off. Pt believes that she needs to leave to go to work. Pt does not realize she is in the hospital.

## 2023-10-21 NOTE — H&P
[facility] [place] [facility] Medicine  History & Physical    Patient Name: Tyra Zavala  MRN: [id number]  Patient Class: OP- Observation  Admission Date: 10/20/2023  Attending Physician: Lang Saavedra III, MD   Primary Care Provider: Jayesh Dolan MD         Patient information was obtained from patient, past medical records and ER records.     Subjective:     Principal Problem:Bradycardia    Chief Complaint:   Chief Complaint   Patient presents with    Bradycardia     [facility]. Ems reports p was found by staff to have HR in 30s and hard to arouse. Reports pt received Zyprexa and ativan at 2230 last night. Ems reports HR in 40s, states giving 1mg atropine. Per ems pt baseline is AAOx1. Pt currently drowsy but oriented to self. Hx dementia.         HPI: 68 y.o. female with hypertension, hyperlipidemia, CKD, and dementia sent from [facility] for evaluation of low heart rate.  Reportedly received Zyprexa and Ativan last night.  Was found to be drowsy this morning with a low heart rate.  EMS was called and patient transported to ED. history limited secondary to patient mental status.  In the ED, overall stable without acute abnormality on workup.  Heart rate was noted to be 40-60.  Cardiology was consulted with recommendation for observation.      Past Medical History:   Diagnosis Date    CKD (chronic kidney disease)     Dementia     HLD (hyperlipidemia)     Hyperkalemia     Hypernatremia     Hypertension        Past Surgical History:   Procedure Laterality Date    APPENDECTOMY         Review of patient's allergies indicates:  No Known Allergies    No current facility-administered medications on file prior to encounter.     Current Outpatient Medications on File Prior to Encounter   Medication Sig    atorvastatin (LIPITOR) 20 MG tablet     diphenhydrAMINE (BENADRYL) 50 mg/mL injection Inject 50 mg into the muscle every 6 (six) hours as needed  for Itching (agitation).    donepeziL (ARICEPT) 5 MG tablet Take 5 mg by mouth every evening.    haloperidol lactate (HALDOL) 5 mg/mL injection Inject 5 mg into the muscle every 6 (six) hours as needed (agitation).    hydroCHLOROthiazide (HYDRODIURIL) 25 MG tablet Take 25 mg by mouth.    insulin lispro 100 unit/mL injection Inject 0-12 Units into the skin 3 (three) times daily before meals. TID before meals and at bedtime  Sliding Scale:  0-60= 0 units  = 0 units  201-250= 4 units  251-300= 6 units  301-350= 8 units  351-400= 10 units  >401 CALL MD= 12 units    LORazepam (ATIVAN) 2 mg/mL injection Inject 2 mg into the muscle every 6 (six) hours as needed (agitation).    losartan (COZAAR) 100 MG tablet Take 100 mg by mouth once daily.    memantine (NAMENDA) 5 MG Tab Take 1 tablet (5 mg total) by mouth 2 (two) times daily.    OLANZapine (ZYPREXA) 2.5 MG tablet Take 2.5 mg by mouth every evening.    traZODone (DESYREL) 50 MG tablet Take 50 mg by mouth every evening.    acetaminophen (TYLENOL) 500 MG tablet Take 2 tablets (1,000 mg total) by mouth every 8 (eight) hours as needed for Pain.    LORazepam (ATIVAN) 0.5 MG tablet One po q 8 hr prn agitation or insomnia. Not intended as daily medication. Do not drive (even bike) or perform dangerous tasks while taking this medication    valsartan (DIOVAN) 160 MG tablet Take 160 mg by mouth once daily.     Family History    None       Tobacco Use    Smoking status: Never    Smokeless tobacco: Never   Substance and Sexual Activity    Alcohol use: Yes     Comment: occasional    Drug use: Never    Sexual activity: Not on file     Review of Systems   Unable to perform ROS: Dementia     Objective:     Vital Signs (Most Recent):  Temp: 97.8 °F (36.6 °C) (10/20/23 1554)  Pulse: (!) 47 (10/20/23 1937)  Resp: (!) 9 (10/20/23 1937)  BP: (!) 149/68 (10/20/23 1937)  SpO2: 99 % (10/20/23 1937) Vital Signs (24h Range):  Temp:  [97.7 °F (36.5 °C)-97.8 °F (36.6 °C)] 97.8  °F (36.6 °C)  Pulse:  [46-65] 47  Resp:  [9-20] 9  SpO2:  [98 %-100 %] 99 %  BP: (138-178)/(65-95) 149/68     Weight: 87.5 kg (193 lb)  Body mass index is 33.13 kg/m².     Physical Exam  Vitals and nursing note reviewed.   Constitutional:       General: She is not in acute distress.     Appearance: She is well-developed.   HENT:      Head: Normocephalic and atraumatic.      Right Ear: External ear normal.      Left Ear: External ear normal.      Nose: Nose normal.   Eyes:      Conjunctiva/sclera: Conjunctivae normal.   Cardiovascular:      Rate and Rhythm: Normal rate and regular rhythm.   Pulmonary:      Effort: Pulmonary effort is normal. No respiratory distress.   Abdominal:      General: There is no distension.      Palpations: Abdomen is soft.   Musculoskeletal:         General: Normal range of motion.   Skin:     General: Skin is warm and dry.   Neurological:      Mental Status: She is lethargic.   Psychiatric:         Behavior: Behavior is slowed and withdrawn.         Thought Content: Thought content normal.         Cognition and Memory: Cognition is impaired. Memory is impaired.                Significant Labs: All pertinent labs within the past 24 hours have been reviewed.    Significant Imaging: I have reviewed all pertinent imaging results/findings within the past 24 hours.    Assessment/Plan:     * Bradycardia  Cardiology consult, appreciate recs.  Monitor On tele.    CKD (chronic kidney disease)  Stable, at baseline, maintain euvolemic state, strict I/O's, monitor renal function and electrolytes, avoid nephrotoxic agents.     HLD (hyperlipidemia)  Continue statin    Dementia  Continue home regimen of donepezil and memantine    Essential hypertension  Well controlled, continue home medications and monitor blood pressure, adjust as needed.       VTE Risk Mitigation (From admission, onward)         Ordered     enoxaparin injection 30 mg  Daily         10/20/23 1442     IP VTE HIGH RISK PATIENT  Once          10/20/23 1442     Place sequential compression device  Until discontinued         10/20/23 1442                     On 10/20/2023, patient should be placed in hospital observation services under my care in collaboration with Lang Saavedra MD.    Kelton Ruiz Jr., APRN, [facility]  Hospitalist - Department of Hospital Medicine  [facility] [address] [place] [address]  Office #: [phone]; Pager #: [phone]

## 2023-10-21 NOTE — DISCHARGE SUMMARY
[facility] [place] Emergency Dept  [facility] Medicine  Discharge Summary      Patient Name: Tyra Zavala  MRN: [id number]  NASIR: [id number]  Patient Class: OP- Observation  Admission Date: 10/20/2023  Hospital Length of Stay: 0 days  Discharge Date and Time: No discharge date for patient encounter.  Attending Physician: Jero Thornton MD   Discharging Provider: Jero Thornton MD  Primary Care Provider: Jayesh Dolan MD    Primary Care Team: Networked reference to record PCT     HPI:   68 y.o. female with hypertension, hyperlipidemia, CKD, and dementia sent from [facility] for evaluation of low heart rate.  Reportedly received Zyprexa and Ativan last night.  Was found to be drowsy this morning with a low heart rate.  EMS was called and patient transported to ED. history limited secondary to patient mental status.  In the ED, overall stable without acute abnormality on workup.  Heart rate was noted to be 40-60.  Cardiology was consulted with recommendation for observation.    Hospital Course:   68 year old woman with HTN, HLD, CKD and dementia who presented from [facility] under  CEC for evaluation of bradycardia.  She was placed in observation and monitored on telemetry with  consult from cardiology.  No high-grade AV block identified on monitoring.  TSH was normal.  Echo  showed normal EF and grade I diastolic dysfunction , severe LA dilation.  Although rare, namenda and  donepezil have been associated with bradycardia and we recommend holding those medications.   Further, we recommend avoiding AVN blocking agents.  She has ambulated with heart rate coming up  into the 60s and feels well with no chest pain or shortness of breath.  She continues with some agitation  and confusion and is medically cleared to return to [facility] under continued CEC.  Recommend f/u with pcp within 1 week and cardiology referral placed for appointment within 1 month.  Make sure to eat a cardiac renal diet with  good hydration.  Continue home blood pressure medications hctz and valsartan.      Goals of Care Treatment Preferences:  Code Status: Full Code      Consults:   Consults (From admission, onward)        Status Ordering Provider     Inpatient consult to Telemedicine - Psych  Once        Provider:  (Not yet assigned)    Acknowledged MICAH HOPPER JR     Inpatient consult to Cardiology  Once        Provider:  Abdiel Apodaca MD    Completed THEA CHOWDHURY          No new Assessment & Plan notes have been filed under this hospital service since the last note was generated.  Service: Hospital Medicine    Final Active Diagnoses:    Diagnosis Date Noted POA    PRINCIPAL PROBLEM:  Bradycardia [R00.1] 10/20/2023 Yes    Dementia [F03.90]  Yes     Chronic    HLD (hyperlipidemia) [E78.5]  Yes     Chronic    CKD (chronic kidney disease) [N18.9]  Yes     Chronic    Essential hypertension [I10] 09/28/2019 Yes     Chronic      Problems Resolved During this Admission:       Discharged Condition: stable    [facility]    Follow Up:    Patient Instructions:      Ambulatory referral/consult to Cardiology   Standing Status: Future   Referral Priority: Routine Referral Type: Consultation   Referral Reason: Specialty Services Required   Requested Specialty: Cardiology   Number of Visits Requested: 1     Diet Cardiac     Diet diabetic     Diet renal     Notify your health care provider if you experience any of the following:  increased confusion or weakness     Notify your health care provider if you experience any of the following:  persistent dizziness, light-headedness, or visual disturbances     Notify your health care provider if you experience any of the following:  worsening rash     Notify your health care provider if you experience any of the following:  severe persistent headache     Notify your health care provider if you experience any of the following:  difficulty breathing or increased cough  "    Notify your health care provider if you experience any of the following:  severe uncontrolled pain     Notify your health care provider if you experience any of the following:  persistent nausea and vomiting or diarrhea     Notify your health care provider if you experience any of the following:  temperature >100.4     Activity as tolerated       Significant Diagnostic Studies: Labs:   BMP:   Recent Labs   Lab 10/20/23  1324 10/21/23  0809   GLU 80 78    146*   K 4.5 3.9   * 113*   CO2 26 24   BUN 30* 33*   CREATININE 1.9* 2.0*   CALCIUM 9.4 9.2   MG 2.0  --    , CMP   Recent Labs   Lab 10/20/23  1324 10/21/23  0809    146*   K 4.5 3.9   * 113*   CO2 26 24   GLU 80 78   BUN 30* 33*   CREATININE 1.9* 2.0*   CALCIUM 9.4 9.2   PROT 7.1  --    ALBUMIN 3.7  --    BILITOT 0.4  --    ALKPHOS 77  --    AST 21  --    ALT 11  --    ANIONGAP 7* 9   , CBC   Recent Labs   Lab 10/20/23  1324   WBC 6.99   HGB 10.3*   HCT 32.3*   *   , INR   Lab Results   Component Value Date    INR 1.1 12/15/2021   , Lipid Panel   Lab Results   Component Value Date    CHOL 230 (H) 05/26/2023    HDL 51 05/26/2023    LDLCALC 156.2 05/26/2023    TRIG 114 05/26/2023    CHOLHDL 22.2 05/26/2023   , Troponin No results for input(s): "TROPONINI" in the last 168 hours. and A1C: No results for input(s): "HGBA1C" in the last 4320 hours.    Pending Diagnostic Studies:     None         Medications:  Reconciled Home Medications:      Medication List      CONTINUE taking these medications    atorvastatin 20 MG tablet  Commonly known as: LIPITOR     hydroCHLOROthiazide 25 MG tablet  Commonly known as: HYDRODIURIL  Take 25 mg by mouth.     OLANZapine 2.5 MG tablet  Commonly known as: ZyPREXA  Take 2.5 mg by mouth every evening.     valsartan 160 MG tablet  Commonly known as: DIOVAN  Take 160 mg by mouth once daily.        STOP taking these medications    acetaminophen 500 MG tablet  Commonly known as: TYLENOL     ATIVAN 2 mg/mL " injection  Generic drug: LORazepam     COZAAR 100 MG tablet  Generic drug: losartan     diphenhydrAMINE 50 mg/mL injection  Commonly known as: BENADRYL     donepeziL 5 MG tablet  Commonly known as: ARICEPT     haloperidol lactate 5 mg/mL injection  Commonly known as: HALDOL     insulin lispro 100 unit/mL injection     LORazepam 0.5 MG tablet  Commonly known as: ATIVAN     memantine 5 MG Tab  Commonly known as: NAMENDA     traZODone 50 MG tablet  Commonly known as: DESYREL            Indwelling Lines/Drains at time of discharge:   Lines/Drains/Airways     Drain  Duration           Female External Urinary Catheter 10/20/23 1 day                Time spent on the discharge of patient: 35 minutes         Jero Thornton MD  Department of Hospital Medicine  [facility] [place] - Emergency Dept

## 2023-10-22 NOTE — ED NOTES
"Patient is awake and is attempting to leave her room. She is standing at the foot of her bed yelling at patient care tech. Patient care tech is attempting to redirect her back to the bed to wait for assistance with getting to the restroom. Patient gait is unsteady and her balance is off. This staff and patient care tech are holding patient to help prevent her from falling and hitting staff. Patient is calling us a man and telling us to get our hands off of her because she has to go pee. "You black hunky bitch!" "You are a man why do you even have your hands on me!" (We are both females) . Patient begins to urinate on the floor and on the bed while standing. Patient care tech grabs a recliner to help assist the patient to a sitting position. We help the Patient down to a recliner and patient continues to use profanity and starts to swing her fist at this staff and the patient care tech rapidly. Security arrives on scene. Security has the  Patient hands and feet secure, while staff members attempts to clean her and get her out of her soil clothing. At this time, The charge nurse, 3 nurses, 1 patient care tech, and 5 security guards are present. All staff members (previously mentioned) are working together to secure the patient, clean her, changing her out of her soiled clothing, changing the soiled linen on the bed, sanitizing her bed, sanitizing the floor, and preventing any staff members or the patient from getting hurt.    "

## 2023-11-09 ENCOUNTER — HOSPITAL ENCOUNTER (OUTPATIENT)
Facility: HOSPITAL | Age: 68
Discharge: NURSING, REHAB OR LONG-TERM CARE | End: 2023-11-10
Attending: EMERGENCY MEDICINE | Admitting: HOSPITALIST
Payer: MEDICARE

## 2023-11-09 DIAGNOSIS — R07.9 CHEST PAIN: ICD-10-CM

## 2023-11-09 DIAGNOSIS — E86.0 DEHYDRATION: ICD-10-CM

## 2023-11-09 DIAGNOSIS — E78.5 HYPERLIPIDEMIA, UNSPECIFIED HYPERLIPIDEMIA TYPE: Chronic | ICD-10-CM

## 2023-11-09 DIAGNOSIS — R79.89 ELEVATED TROPONIN: ICD-10-CM

## 2023-11-09 DIAGNOSIS — N18.31 STAGE 3A CHRONIC KIDNEY DISEASE: Chronic | ICD-10-CM

## 2023-11-09 DIAGNOSIS — R55 VASOVAGAL SYNCOPE: Primary | ICD-10-CM

## 2023-11-09 DIAGNOSIS — E87.0 HYPERNATREMIA: ICD-10-CM

## 2023-11-09 DIAGNOSIS — N17.9 AKI (ACUTE KIDNEY INJURY): ICD-10-CM

## 2023-11-09 DIAGNOSIS — I10 ESSENTIAL HYPERTENSION: Chronic | ICD-10-CM

## 2023-11-09 DIAGNOSIS — F03.918 DEMENTIA WITH BEHAVIORAL DISTURBANCE: ICD-10-CM

## 2023-11-09 DIAGNOSIS — R55 SYNCOPE: ICD-10-CM

## 2023-11-09 LAB
ALBUMIN SERPL BCP-MCNC: 3.9 G/DL (ref 3.5–5.2)
ALP SERPL-CCNC: 71 U/L (ref 55–135)
ALT SERPL W/O P-5'-P-CCNC: 17 U/L (ref 10–44)
ANION GAP SERPL CALC-SCNC: 11 MMOL/L (ref 8–16)
AST SERPL-CCNC: 23 U/L (ref 10–40)
BASOPHILS # BLD AUTO: 0.02 K/UL (ref 0–0.2)
BASOPHILS NFR BLD: 0.2 % (ref 0–1.9)
BILIRUB SERPL-MCNC: 0.4 MG/DL (ref 0.1–1)
BNP SERPL-MCNC: 19 PG/ML (ref 0–99)
BUN SERPL-MCNC: 54 MG/DL (ref 8–23)
CALCIUM SERPL-MCNC: 10 MG/DL (ref 8.7–10.5)
CHLORIDE SERPL-SCNC: 112 MMOL/L (ref 95–110)
CO2 SERPL-SCNC: 26 MMOL/L (ref 23–29)
CREAT SERPL-MCNC: 3.1 MG/DL (ref 0.5–1.4)
DIFFERENTIAL METHOD: ABNORMAL
EOSINOPHIL # BLD AUTO: 0.1 K/UL (ref 0–0.5)
EOSINOPHIL NFR BLD: 0.5 % (ref 0–8)
ERYTHROCYTE [DISTWIDTH] IN BLOOD BY AUTOMATED COUNT: 14.5 % (ref 11.5–14.5)
EST. GFR  (NO RACE VARIABLE): 15.7 ML/MIN/1.73 M^2
ETHANOL SERPL-MCNC: <10 MG/DL
GLUCOSE SERPL-MCNC: 179 MG/DL (ref 70–110)
HCT VFR BLD AUTO: 35.6 % (ref 37–48.5)
HGB BLD-MCNC: 11.1 G/DL (ref 12–16)
IMM GRANULOCYTES # BLD AUTO: 0.04 K/UL (ref 0–0.04)
IMM GRANULOCYTES NFR BLD AUTO: 0.4 % (ref 0–0.5)
LACTATE SERPL-SCNC: 1.2 MMOL/L (ref 0.5–2.2)
LYMPHOCYTES # BLD AUTO: 1.6 K/UL (ref 1–4.8)
LYMPHOCYTES NFR BLD: 14.7 % (ref 18–48)
MCH RBC QN AUTO: 27.1 PG (ref 27–31)
MCHC RBC AUTO-ENTMCNC: 31.2 G/DL (ref 32–36)
MCV RBC AUTO: 87 FL (ref 82–98)
MONOCYTES # BLD AUTO: 0.4 K/UL (ref 0.3–1)
MONOCYTES NFR BLD: 3.6 % (ref 4–15)
NEUTROPHILS # BLD AUTO: 9 K/UL (ref 1.8–7.7)
NEUTROPHILS NFR BLD: 80.6 % (ref 38–73)
NRBC BLD-RTO: 0 /100 WBC
PLATELET # BLD AUTO: 168 K/UL (ref 150–450)
PMV BLD AUTO: 13.5 FL (ref 9.2–12.9)
POTASSIUM SERPL-SCNC: 4.4 MMOL/L (ref 3.5–5.1)
PROT SERPL-MCNC: 7.7 G/DL (ref 6–8.4)
RBC # BLD AUTO: 4.1 M/UL (ref 4–5.4)
SODIUM SERPL-SCNC: 149 MMOL/L (ref 136–145)
TROPONIN I SERPL DL<=0.01 NG/ML-MCNC: 0.04 NG/ML (ref 0–0.03)
TSH SERPL DL<=0.005 MIU/L-ACNC: 1.52 UIU/ML (ref 0.4–4)
WBC # BLD AUTO: 11.18 K/UL (ref 3.9–12.7)

## 2023-11-09 PROCEDURE — 63600175 PHARM REV CODE 636 W HCPCS: Performed by: HOSPITALIST

## 2023-11-09 PROCEDURE — 83880 ASSAY OF NATRIURETIC PEPTIDE: CPT | Mod: ER | Performed by: EMERGENCY MEDICINE

## 2023-11-09 PROCEDURE — 96376 TX/PRO/DX INJ SAME DRUG ADON: CPT | Mod: ER

## 2023-11-09 PROCEDURE — 84443 ASSAY THYROID STIM HORMONE: CPT | Mod: ER | Performed by: EMERGENCY MEDICINE

## 2023-11-09 PROCEDURE — 93010 ELECTROCARDIOGRAM REPORT: CPT | Mod: ,,, | Performed by: INTERNAL MEDICINE

## 2023-11-09 PROCEDURE — 93005 ELECTROCARDIOGRAM TRACING: CPT | Mod: ER

## 2023-11-09 PROCEDURE — 82077 ASSAY SPEC XCP UR&BREATH IA: CPT | Mod: ER | Performed by: EMERGENCY MEDICINE

## 2023-11-09 PROCEDURE — 63600175 PHARM REV CODE 636 W HCPCS: Performed by: NURSE PRACTITIONER

## 2023-11-09 PROCEDURE — 99285 EMERGENCY DEPT VISIT HI MDM: CPT | Mod: ER

## 2023-11-09 PROCEDURE — 96372 THER/PROPH/DIAG INJ SC/IM: CPT | Mod: 59 | Performed by: HOSPITALIST

## 2023-11-09 PROCEDURE — 85025 COMPLETE CBC W/AUTO DIFF WBC: CPT | Mod: ER | Performed by: EMERGENCY MEDICINE

## 2023-11-09 PROCEDURE — 84484 ASSAY OF TROPONIN QUANT: CPT | Mod: ER | Performed by: EMERGENCY MEDICINE

## 2023-11-09 PROCEDURE — 96374 THER/PROPH/DIAG INJ IV PUSH: CPT | Mod: ER

## 2023-11-09 PROCEDURE — 93010 EKG 12-LEAD: ICD-10-PCS | Mod: ,,, | Performed by: INTERNAL MEDICINE

## 2023-11-09 PROCEDURE — G0378 HOSPITAL OBSERVATION PER HR: HCPCS | Mod: ER

## 2023-11-09 PROCEDURE — 96361 HYDRATE IV INFUSION ADD-ON: CPT | Mod: ER

## 2023-11-09 PROCEDURE — G0378 HOSPITAL OBSERVATION PER HR: HCPCS

## 2023-11-09 PROCEDURE — 63600175 PHARM REV CODE 636 W HCPCS: Mod: ER | Performed by: EMERGENCY MEDICINE

## 2023-11-09 PROCEDURE — 96372 THER/PROPH/DIAG INJ SC/IM: CPT | Performed by: NURSE PRACTITIONER

## 2023-11-09 PROCEDURE — 80053 COMPREHEN METABOLIC PANEL: CPT | Mod: ER | Performed by: EMERGENCY MEDICINE

## 2023-11-09 PROCEDURE — 83605 ASSAY OF LACTIC ACID: CPT | Mod: ER | Performed by: EMERGENCY MEDICINE

## 2023-11-09 RX ORDER — PROMETHAZINE HYDROCHLORIDE 25 MG/1
25 TABLET ORAL EVERY 6 HOURS PRN
Status: DISCONTINUED | OUTPATIENT
Start: 2023-11-09 | End: 2023-11-10 | Stop reason: HOSPADM

## 2023-11-09 RX ORDER — HALOPERIDOL 5 MG/ML
2.5 INJECTION INTRAMUSCULAR
Status: COMPLETED | OUTPATIENT
Start: 2023-11-09 | End: 2023-11-09

## 2023-11-09 RX ORDER — GLUCAGON 1 MG
1 KIT INJECTION
Status: DISCONTINUED | OUTPATIENT
Start: 2023-11-09 | End: 2023-11-10 | Stop reason: HOSPADM

## 2023-11-09 RX ORDER — ONDANSETRON 2 MG/ML
4 INJECTION INTRAMUSCULAR; INTRAVENOUS EVERY 8 HOURS PRN
Status: DISCONTINUED | OUTPATIENT
Start: 2023-11-09 | End: 2023-11-10 | Stop reason: HOSPADM

## 2023-11-09 RX ORDER — SODIUM CHLORIDE, SODIUM LACTATE, POTASSIUM CHLORIDE, CALCIUM CHLORIDE 600; 310; 30; 20 MG/100ML; MG/100ML; MG/100ML; MG/100ML
INJECTION, SOLUTION INTRAVENOUS CONTINUOUS
Status: DISCONTINUED | OUTPATIENT
Start: 2023-11-09 | End: 2023-11-10 | Stop reason: HOSPADM

## 2023-11-09 RX ORDER — MAG HYDROX/ALUMINUM HYD/SIMETH 200-200-20
30 SUSPENSION, ORAL (FINAL DOSE FORM) ORAL 4 TIMES DAILY PRN
Status: DISCONTINUED | OUTPATIENT
Start: 2023-11-09 | End: 2023-11-10 | Stop reason: HOSPADM

## 2023-11-09 RX ORDER — LOSARTAN POTASSIUM 50 MG/1
50 TABLET ORAL DAILY
COMMUNITY

## 2023-11-09 RX ORDER — IBUPROFEN 200 MG
24 TABLET ORAL
Status: DISCONTINUED | OUTPATIENT
Start: 2023-11-09 | End: 2023-11-10 | Stop reason: HOSPADM

## 2023-11-09 RX ORDER — ZIPRASIDONE HYDROCHLORIDE 20 MG/1
20 CAPSULE ORAL 2 TIMES DAILY
COMMUNITY

## 2023-11-09 RX ORDER — TRAZODONE HYDROCHLORIDE 50 MG/1
50 TABLET ORAL NIGHTLY
COMMUNITY

## 2023-11-09 RX ORDER — SIMETHICONE 80 MG
1 TABLET,CHEWABLE ORAL 4 TIMES DAILY PRN
Status: DISCONTINUED | OUTPATIENT
Start: 2023-11-09 | End: 2023-11-10 | Stop reason: HOSPADM

## 2023-11-09 RX ORDER — ACETAMINOPHEN 325 MG/1
650 TABLET ORAL EVERY 6 HOURS PRN
Status: DISCONTINUED | OUTPATIENT
Start: 2023-11-09 | End: 2023-11-10 | Stop reason: HOSPADM

## 2023-11-09 RX ORDER — NALOXONE HCL 0.4 MG/ML
0.02 VIAL (ML) INJECTION
Status: DISCONTINUED | OUTPATIENT
Start: 2023-11-09 | End: 2023-11-10 | Stop reason: HOSPADM

## 2023-11-09 RX ORDER — OLANZAPINE 10 MG/2ML
5 INJECTION, POWDER, FOR SOLUTION INTRAMUSCULAR ONCE AS NEEDED
Status: COMPLETED | OUTPATIENT
Start: 2023-11-09 | End: 2023-11-09

## 2023-11-09 RX ORDER — POLYETHYLENE GLYCOL 3350 17 G/17G
17 POWDER, FOR SOLUTION ORAL DAILY PRN
Status: DISCONTINUED | OUTPATIENT
Start: 2023-11-09 | End: 2023-11-10 | Stop reason: HOSPADM

## 2023-11-09 RX ORDER — BUSPIRONE HYDROCHLORIDE 10 MG/1
10 TABLET ORAL 2 TIMES DAILY PRN
COMMUNITY
Start: 2023-10-02

## 2023-11-09 RX ORDER — IBUPROFEN 200 MG
16 TABLET ORAL
Status: DISCONTINUED | OUTPATIENT
Start: 2023-11-09 | End: 2023-11-10 | Stop reason: HOSPADM

## 2023-11-09 RX ORDER — TALC
6 POWDER (GRAM) TOPICAL NIGHTLY PRN
Status: DISCONTINUED | OUTPATIENT
Start: 2023-11-09 | End: 2023-11-10 | Stop reason: HOSPADM

## 2023-11-09 RX ORDER — ENOXAPARIN SODIUM 100 MG/ML
30 INJECTION SUBCUTANEOUS EVERY 24 HOURS
Status: DISCONTINUED | OUTPATIENT
Start: 2023-11-09 | End: 2023-11-10 | Stop reason: HOSPADM

## 2023-11-09 RX ORDER — ACETAMINOPHEN 650 MG/1
650 SUPPOSITORY RECTAL EVERY 4 HOURS PRN
Status: DISCONTINUED | OUTPATIENT
Start: 2023-11-09 | End: 2023-11-10 | Stop reason: HOSPADM

## 2023-11-09 RX ORDER — SODIUM CHLORIDE 0.9 % (FLUSH) 0.9 %
3 SYRINGE (ML) INJECTION EVERY 12 HOURS PRN
Status: DISCONTINUED | OUTPATIENT
Start: 2023-11-09 | End: 2023-11-10 | Stop reason: HOSPADM

## 2023-11-09 RX ADMIN — HALOPERIDOL LACTATE 2.5 MG: 5 INJECTION, SOLUTION INTRAMUSCULAR at 07:11

## 2023-11-09 RX ADMIN — SODIUM CHLORIDE, POTASSIUM CHLORIDE, SODIUM LACTATE AND CALCIUM CHLORIDE 1000 ML: 600; 310; 30; 20 INJECTION, SOLUTION INTRAVENOUS at 06:11

## 2023-11-09 RX ADMIN — OLANZAPINE 5 MG: 10 INJECTION, POWDER, FOR SOLUTION INTRAMUSCULAR at 11:11

## 2023-11-09 RX ADMIN — ENOXAPARIN SODIUM 30 MG: 30 INJECTION SUBCUTANEOUS at 11:11

## 2023-11-09 NOTE — ED PROVIDER NOTES
Encounter Date: 11/9/2023       History     Chief Complaint   Patient presents with    Loss of Consciousness     [facility]. Per [facility] staff, pt was sitting in day room when she went unresponsive for about one minute. While unresponsive she was bowel incontinent. Pt back at baseline when aasi arrived. Hx of dementia and psych hx.      The history is provided by the patient and the EMS personnel. The history is limited by the condition of the patient.   Loss of Consciousness  This is a new problem. The current episode started 1 to 2 hours ago. The problem occurs rarely. The problem has been resolved. Pertinent negatives include no chest pain and no shortness of breath.   Pt c Hx Dementia presents c Syncopal event while eating at rehab facility. Staff report pt is back to baseline, confused, disoriented.  Review of patient's allergies indicates:  No Known Allergies  Past Medical History:   Diagnosis Date    CKD (chronic kidney disease)     Dementia     HLD (hyperlipidemia)     Hyperkalemia     Hypernatremia     Hypertension      Past Surgical History:   Procedure Laterality Date    APPENDECTOMY       No family history on file.  Social History     Tobacco Use    Smoking status: Never    Smokeless tobacco: Never   Substance Use Topics    Alcohol use: Yes     Comment: occasional    Drug use: Never     Review of Systems   Constitutional:  Negative for fever.   HENT:  Negative for sore throat.    Respiratory:  Negative for shortness of breath.    Cardiovascular:  Positive for syncope. Negative for chest pain.   Gastrointestinal:  Negative for nausea.   Genitourinary:  Negative for dysuria.   Musculoskeletal:  Negative for back pain.   Skin:  Negative for rash.   Neurological:  Positive for syncope. Negative for weakness.   Hematological:  Does not bruise/bleed easily.       Physical Exam     Initial Vitals [11/09/23 1633]   BP Pulse Resp Temp SpO2   (!) 120/57 61 18 98.1 °F (36.7 °C) 100 %      MAP        --         Vitals:    11/09/23 1633 11/09/23 1646 11/09/23 1733 11/09/23 1834   BP: (!) 120/57  (!) 120/52 136/63   Pulse: 61 65 69 65   Resp: 18      Temp: 98.1 °F (36.7 °C)      TempSrc: Oral      SpO2: 100%  100% 99%   Weight: 87.5 kg (192 lb 14.4 oz)       11/09/23 1903 11/09/23 1958 11/09/23 2003 11/09/23 2128   BP: 123/75  (!) 138/91    Pulse: 80 101  74   Resp:    18   Temp:    99 °F (37.2 °C)   TempSrc:    Oral   SpO2: 96% 95%  96%   Weight:        11/09/23 2130 11/09/23 2141 11/09/23 2305 11/10/23 0040   BP: (!) 133/53   139/64   Pulse: 75 82 70 (!) 55   Resp:    18   Temp:    97.6 °F (36.4 °C)   TempSrc:    Oral   SpO2:    100%   Weight:        11/10/23 0101 11/10/23 0413 11/10/23 0414   BP:  (!) 125/58    Pulse: 70 (!) 53 (!) 54   Resp:  18    Temp:  98 °F (36.7 °C)    TempSrc:  Axillary    SpO2:  100%    Weight:            Physical Exam    Nursing note and vitals reviewed.  Constitutional: She appears well-developed and well-nourished. No distress.   HENT:   Head: Normocephalic and atraumatic.   Mouth/Throat: Oropharynx is clear and moist.   Eyes: Conjunctivae and EOM are normal. Pupils are equal, round, and reactive to light.   Neck: Neck supple.   Normal range of motion.  Cardiovascular:  Normal rate, regular rhythm and normal heart sounds.           Pulmonary/Chest: Breath sounds normal. No respiratory distress.   Abdominal: Abdomen is soft. Bowel sounds are normal. She exhibits no distension. There is no abdominal tenderness.   Musculoskeletal:         General: Normal range of motion.      Cervical back: Normal range of motion and neck supple.     Neurological: She is alert. She has normal strength.   Baseline    Skin: Skin is warm and dry.         ED Course   Critical Care    Date/Time: 11/10/2023 4:27 AM    Performed by: Jake Herbert MD  Authorized by: Nigel Shukla MD  Total critical care time (exclusive of procedural time) : 0 minutes  Critical care time was exclusive of  separately billable procedures and treating other patients.  Critical care was necessary to treat or prevent imminent or life-threatening deterioration of the following conditions: renal failure.  Critical care was time spent personally by me on the following activities: ordering and review of radiographic studies, ordering and review of laboratory studies, ordering and performing treatments and interventions, evaluation of patient's response to treatment, examination of patient, re-evaluation of patient's condition, pulse oximetry, discussions with consultants and development of treatment plan with patient or surrogate.  Subsequent provider of critical care: I assumed direction of critical care for this patient from another provider of my specialty.        Labs Reviewed   CBC W/ AUTO DIFFERENTIAL - Abnormal; Notable for the following components:       Result Value    Hemoglobin 11.1 (*)     Hematocrit 35.6 (*)     MCHC 31.2 (*)     MPV 13.5 (*)     Gran # (ANC) 9.0 (*)     Gran % 80.6 (*)     Lymph % 14.7 (*)     Mono % 3.6 (*)     All other components within normal limits   COMPREHENSIVE METABOLIC PANEL - Abnormal; Notable for the following components:    Sodium 149 (*)     Chloride 112 (*)     Glucose 179 (*)     BUN 54 (*)     Creatinine 3.1 (*)     eGFR 15.7 (*)     All other components within normal limits   TROPONIN I - Abnormal; Notable for the following components:    Troponin I 0.043 (*)     All other components within normal limits   B-TYPE NATRIURETIC PEPTIDE   TSH   ALCOHOL,MEDICAL (ETHANOL)   LACTIC ACID, PLASMA   URINALYSIS, REFLEX TO URINE CULTURE   DRUG SCREEN PANEL, URINE EMERGENCY     Labs were independently reviewed.  CBC largely unremarkable.  Chemistry notable for elevated BUN and creatinine of 54 and 3.1 respectively with a GFR of 15.7.  Patient is notably hypernatremia with a sodium of 149 and hyperchloremic with a chloride of 112.  Natriuretic peptide unremarkable.  TSH unremarkable.   Alcohol and lactate negative.  Toxicology screen negative.  Troponin level mildly elevated at 0.043 relatively unchanged from prior value of record from April.  Urinalysis pending patient's ability to provide urine sample.    EKG Readings: (Independently Interpreted)   Rhythm: Sinus Arrhythmia. Heart Rate: 67. ST Segments: Normal ST Segments. T Waves: Normal. Axis: Normal. Other Findings: Shortened DC Interval. Clinical Impression: Sinus Arrhythmia       Imaging Results    None          Medications   sodium chloride 0.9% flush 3 mL (has no administration in time range)   lactated ringers infusion ( Intravenous New Bag 11/10/23 0006)   melatonin tablet 6 mg (has no administration in time range)   ondansetron injection 4 mg (has no administration in time range)   promethazine tablet 25 mg (has no administration in time range)   polyethylene glycol packet 17 g (has no administration in time range)   acetaminophen tablet 650 mg (has no administration in time range)   simethicone chewable tablet 80 mg (has no administration in time range)   aluminum-magnesium hydroxide-simethicone 200-200-20 mg/5 mL suspension 30 mL (has no administration in time range)   acetaminophen suppository 650 mg (has no administration in time range)   naloxone 0.4 mg/mL injection 0.02 mg (has no administration in time range)   glucose chewable tablet 16 g (has no administration in time range)   glucose chewable tablet 24 g (has no administration in time range)   glucagon (human recombinant) injection 1 mg (has no administration in time range)   enoxaparin injection 30 mg (30 mg Subcutaneous Given 11/9/23 2310)   dextrose 10% bolus 125 mL 125 mL (has no administration in time range)   dextrose 10% bolus 250 mL 250 mL (has no administration in time range)   lactated ringers bolus 1,000 mL (0 mLs Intravenous Stopped 11/9/23 1916)   haloperidol lactate injection 2.5 mg (2.5 mg Intravenous Given 11/9/23 1912)   haloperidol lactate injection 2.5 mg  (2.5 mg Intravenous Given 11/9/23 1958)   OLANZapine injection 5 mg (5 mg Intramuscular Given 11/9/23 2306)     Medical Decision Making  Problems Addressed:  Syncope: acute illness or injury    Amount and/or Complexity of Data Reviewed  Labs: ordered.  Radiology: ordered.  ECG/medicine tests: ordered and independent interpretation performed. Decision-making details documented in ED Course.    Risk  Prescription drug management.    All historical, clinical, radiographic, and laboratory findings were reviewed with the patient/family in detail along with the indications for admission in order to receive IV fluids, cardiac monitoring, serial troponin levels, and further evaluation of syncope.  All remaining questions and concerns were addressed at this time and the patient/family communicates understanding and agrees to proceed accordingly.  Similarly, all pertinent details of the encounter were discussed with Dr. Shukla who agrees to receive the patient for further care as outlined above at [facility] in .               ED Course as of 11/10/23 0429   Thu Nov 09, 2023 1818 The patient was received from the off-going emergency room physician Dr Burciaga at 6:00PM.  All pertinent details presently available from the patient encounter were discussed along with the expected plan for disposition.     [ST]      ED Course User Index  [ST] Jake Herbert MD                    Clinical Impression:   Final diagnoses:  [R55] Syncope (Primary)  [N17.9] BONITA (acute kidney injury)  [E86.0] Dehydration  [E87.0] Hypernatremia  [R79.89] Elevated troponin        ED Disposition Condition    Observation Stable                         Jake Herbert MD  11/10/23 0428       Jake Herbert MD  11/10/23 0429       Jake Herbert MD  11/10/23 0506

## 2023-11-10 VITALS
HEART RATE: 51 BPM | TEMPERATURE: 98 F | WEIGHT: 192.88 LBS | RESPIRATION RATE: 20 BRPM | SYSTOLIC BLOOD PRESSURE: 127 MMHG | DIASTOLIC BLOOD PRESSURE: 97 MMHG | BODY MASS INDEX: 33.11 KG/M2 | OXYGEN SATURATION: 98 %

## 2023-11-10 PROBLEM — N17.9 ACUTE ON CHRONIC KIDNEY FAILURE: Status: ACTIVE | Noted: 2023-11-10

## 2023-11-10 PROBLEM — N18.9 ACUTE ON CHRONIC KIDNEY FAILURE: Status: ACTIVE | Noted: 2023-11-10

## 2023-11-10 PROBLEM — R79.89 ELEVATED TROPONIN: Status: ACTIVE | Noted: 2023-11-10

## 2023-11-10 PROBLEM — R55 SYNCOPE: Status: ACTIVE | Noted: 2023-11-10

## 2023-11-10 LAB
ANION GAP SERPL CALC-SCNC: 15 MMOL/L (ref 8–16)
BUN SERPL-MCNC: 49 MG/DL (ref 8–23)
CALCIUM SERPL-MCNC: 9.6 MG/DL (ref 8.7–10.5)
CHLORIDE SERPL-SCNC: 114 MMOL/L (ref 95–110)
CO2 SERPL-SCNC: 20 MMOL/L (ref 23–29)
CREAT SERPL-MCNC: 2.9 MG/DL (ref 0.5–1.4)
EST. GFR  (NO RACE VARIABLE): 17 ML/MIN/1.73 M^2
GLUCOSE SERPL-MCNC: 84 MG/DL (ref 70–110)
POTASSIUM SERPL-SCNC: 4.4 MMOL/L (ref 3.5–5.1)
SODIUM SERPL-SCNC: 149 MMOL/L (ref 136–145)
TROPONIN I SERPL DL<=0.01 NG/ML-MCNC: 0.04 NG/ML (ref 0–0.03)

## 2023-11-10 PROCEDURE — 36415 COLL VENOUS BLD VENIPUNCTURE: CPT | Performed by: NURSE PRACTITIONER

## 2023-11-10 PROCEDURE — 84484 ASSAY OF TROPONIN QUANT: CPT | Performed by: NURSE PRACTITIONER

## 2023-11-10 PROCEDURE — G0378 HOSPITAL OBSERVATION PER HR: HCPCS

## 2023-11-10 PROCEDURE — 94760 N-INVAS EAR/PLS OXIMETRY 1: CPT

## 2023-11-10 PROCEDURE — 80048 BASIC METABOLIC PNL TOTAL CA: CPT | Performed by: NURSE PRACTITIONER

## 2023-11-10 PROCEDURE — 63600175 PHARM REV CODE 636 W HCPCS: Performed by: NURSE PRACTITIONER

## 2023-11-10 PROCEDURE — 96361 HYDRATE IV INFUSION ADD-ON: CPT

## 2023-11-10 RX ADMIN — SODIUM CHLORIDE, POTASSIUM CHLORIDE, SODIUM LACTATE AND CALCIUM CHLORIDE: 600; 310; 30; 20 INJECTION, SOLUTION INTRAVENOUS at 12:11

## 2023-11-10 NOTE — PROGRESS NOTES
Pharmacist Renal Dose Adjustment Note    Tyra Zavala is a 68 y.o. female being treated with the medication enoxaparin    Patient Data:    Vital Signs (Most Recent):  Temp: 99 °F (37.2 °C) (11/09/23 2128)  Pulse: 82 (11/09/23 2141)  Resp: 18 (11/09/23 2128)  BP: (!) 133/53 (11/09/23 2130)  SpO2: 96 % (11/09/23 2128) Vital Signs (72h Range):  Temp:  [98.1 °F (36.7 °C)-99 °F (37.2 °C)]   Pulse:  []   Resp:  [18]   BP: (120-138)/(52-91)   SpO2:  [95 %-100 %]      Recent Labs   Lab 11/09/23 1719   CREATININE 3.1*     Serum creatinine: 3.1 mg/dL (H) 11/09/23 1719  Estimated creatinine clearance: 18.3 mL/min (A)    Enoxaparin 40 mg q24h will be changed to enoxaparin 30 mg q24h for CrCl <30 mL/min.     Pharmacist's Name: Zully Porter  Pharmacist's Extension: [phone]

## 2023-11-10 NOTE — ASSESSMENT & PLAN NOTE
Dementia is uncontrolled currently. Continue home dementia meds and non-pharmacologic interventions to prevent delirium (No VS between 11PM-5AM, activity during day, opening blinds, providing glasses/hearing aids, and up in chair during daytime). Use PRN anti-psychotics to prevent behavior of self harm during sundowning, and avoid narcotics and benzos unless absolutely necessary. PRN anti-psychotics prescribed to avoid self harm behaviors.

## 2023-11-10 NOTE — PLAN OF CARE
Discussed poc with pt, pt. Unable to verbalize understanding   Pt. Remain combative and restless  In 2 point restrain with sitter at bedside and therapeutic music playing    Purposeful rounding every 2hours    VS wnl  Cardiac monitoring in use  Fall precautions in place, remains injury free  Pt denies c/o n/v and pain    IVFs  Accurate I&Os  Abx given as prescribed  Bed locked at lowest position  Call light within reach    Chart check complete  Will cont with POC     Problem: Violence Risk or Actual  Goal: Anger and Impulse Control  Outcome: Ongoing, Progressing     Problem: Adult Inpatient Plan of Care  Goal: Plan of Care Review  Outcome: Ongoing, Progressing  Goal: Patient-Specific Goal (Individualized)  Outcome: Ongoing, Progressing  Goal: Absence of Hospital-Acquired Illness or Injury  Outcome: Ongoing, Progressing  Goal: Optimal Comfort and Wellbeing  Outcome: Ongoing, Progressing  Goal: Readiness for Transition of Care  Outcome: Ongoing, Progressing     Problem: Fall Injury Risk  Goal: Absence of Fall and Fall-Related Injury  Outcome: Ongoing, Progressing     Problem: Fluid and Electrolyte Imbalance (Acute Kidney Injury/Impairment)  Goal: Fluid and Electrolyte Balance  Outcome: Ongoing, Progressing     Problem: Oral Intake Inadequate (Acute Kidney Injury/Impairment)  Goal: Optimal Nutrition Intake  Outcome: Ongoing, Progressing     Problem: Renal Function Impairment (Acute Kidney Injury/Impairment)  Goal: Effective Renal Function  Outcome: Ongoing, Progressing     Problem: Skin Injury Risk Increased  Goal: Skin Health and Integrity  Outcome: Ongoing, Progressing     Problem: Restraint, Nonbehavioral (Nonviolent)  Goal: Absence of Harm or Injury  Outcome: Ongoing, Progressing

## 2023-11-10 NOTE — NURSING
Patient sitting up in bed eating lunch. Restraints removed. Patient confused. Patient ambulating in hallway, stable on feet. Escorted by sitter and returned to room.

## 2023-11-10 NOTE — H&P
"[facility]  History & Physical    Patient Name: Tyra Zavala  MRN: [id number]  Patient Class: OP- Observation  Admission Date: 11/9/2023  Attending Physician: Nigel Shukla MD   Primary Care Provider: Jayesh Dolan MD         Patient information was obtained from past medical records and ER records.     Subjective:     Principal Problem:Syncope    Chief Complaint:   Chief Complaint   Patient presents with    Loss of Consciousness     Pt transported to er from [facility]. Per NH staff, pt was sitting in day room when she went unresponsive for about one minute. While unresponsive she was bowel incontinent. Pt back at baseline when aasi arrived. Hx of dementia and psych hx.         HPI: Tyra Zavala is a 68 y.o. female with a PMH  has a past medical history of CKD (chronic kidney disease), Dementia, HLD (hyperlipidemia), Hyperkalemia, Hypernatremia, and Hypertension.  History limited secondary to patient's change in mental status.  Patient is a transfer from our [facility] facility for further eval for syncopal episode that was witnessed earlier today by nursing home staff.     Per ED providers/triage note:    "Pt transported to er from [facility]. Per NH staff, pt was sitting in day room when she went unresponsive for about one minute. While unresponsive she was bowel incontinent. Pt back at baseline when aasi arrived. Hx of dementia and psych hx".     ER workup remarkable for sodium of 149, BUN/creatinine of 54/3.1, , troponin 0.043.  Lactic acid, TSH, and all other lab work unremarkable.  Last echocardiogram performed on 10/20/2033 revealed normal LVEF.  Patient received 1 L of LR in addition to 2 doses of 2.5 mg of Haldol for agitation/combativeness.  Patient admitted to our hospital under observation status further workup.    PCP: Jayesh Dolan\        Past Medical History:   Diagnosis Date    CKD (chronic kidney disease)     Dementia     HLD " (hyperlipidemia)     Hyperkalemia     Hypernatremia     Hypertension        Past Surgical History:   Procedure Laterality Date    APPENDECTOMY         Review of patient's allergies indicates:  No Known Allergies    No current facility-administered medications on file prior to encounter.     Current Outpatient Medications on File Prior to Encounter   Medication Sig    atorvastatin (LIPITOR) 20 MG tablet     busPIRone (BUSPAR) 10 MG tablet Take 10 mg by mouth 2 (two) times daily as needed.    hydroCHLOROthiazide (HYDRODIURIL) 25 MG tablet Take 25 mg by mouth.    losartan (COZAAR) 50 MG tablet Take 50 mg by mouth once daily.    OLANZapine (ZYPREXA) 2.5 MG tablet Take 2.5 mg by mouth every evening.    traZODone (DESYREL) 50 MG tablet Take 50 mg by mouth every evening.    ziprasidone (GEODON) 20 MG Cap Take 20 mg by mouth 2 (two) times daily.    valsartan (DIOVAN) 160 MG tablet Take 160 mg by mouth once daily.     Family History    None       Tobacco Use    Smoking status: Never    Smokeless tobacco: Never   Substance and Sexual Activity    Alcohol use: Yes     Comment: occasional    Drug use: Never    Sexual activity: Not on file     Review of Systems   Unable to perform ROS: Acuity of condition     Objective:     Vital Signs (Most Recent):  Temp: 99 °F (37.2 °C) (11/09/23 2128)  Pulse: 82 (11/09/23 2141)  Resp: 18 (11/09/23 2128)  BP: (!) 133/53 (11/09/23 2130)  SpO2: 96 % (11/09/23 2128) Vital Signs (24h Range):  Temp:  [98.1 °F (36.7 °C)-99 °F (37.2 °C)] 99 °F (37.2 °C)  Pulse:  [] 82  Resp:  [18] 18  SpO2:  [95 %-100 %] 96 %  BP: (120-138)/(52-91) 133/53     Weight: 87.5 kg (192 lb 14.4 oz)  Body mass index is 33.11 kg/m².     Physical Exam  Vitals and nursing note reviewed.   Constitutional:       General: She is awake. She is not in acute distress.     Appearance: Normal appearance. She is well-developed and well-groomed. She is not ill-appearing, toxic-appearing or diaphoretic.       Comments: Combative with staff, pulling clothing off, unable to redirect   HENT:      Head: Normocephalic and atraumatic.   Eyes:      Extraocular Movements: Extraocular movements intact.      Conjunctiva/sclera: Conjunctivae normal.   Cardiovascular:      Rate and Rhythm: Normal rate and regular rhythm.      Heart sounds: Normal heart sounds. No murmur heard.  Pulmonary:      Effort: Pulmonary effort is normal.      Breath sounds: Normal breath sounds.   Abdominal:      General: Bowel sounds are normal.      Palpations: Abdomen is soft.      Tenderness: There is no abdominal tenderness.   Musculoskeletal:      Cervical back: Normal range of motion and neck supple.      Comments: 5/5 strength throughout   Skin:     General: Skin is warm and dry.      Capillary Refill: Capillary refill takes less than 2 seconds.   Neurological:      General: No focal deficit present.      Mental Status: She is alert. She is confused.      GCS: GCS eye subscore is 4. GCS verbal subscore is 4. GCS motor subscore is 6.      Motor: Motor function is intact.   Psychiatric:         Behavior: Behavior is uncooperative, agitated and combative.              LABS:  Recent Results (from the past 24 hour(s))   CBC Auto Differential    Collection Time: 11/09/23  5:19 PM   Result Value Ref Range    WBC 11.18 3.90 - 12.70 K/uL    RBC 4.10 4.00 - 5.40 M/uL    Hemoglobin 11.1 (L) 12.0 - 16.0 g/dL    Hematocrit 35.6 (L) 37.0 - 48.5 %    MCV 87 82 - 98 fL    MCH 27.1 27.0 - 31.0 pg    MCHC 31.2 (L) 32.0 - 36.0 g/dL    RDW 14.5 11.5 - 14.5 %    Platelets 168 150 - 450 K/uL    MPV 13.5 (H) 9.2 - 12.9 fL    Immature Granulocytes 0.4 0.0 - 0.5 %    Gran # (ANC) 9.0 (H) 1.8 - 7.7 K/uL    Immature Grans (Abs) 0.04 0.00 - 0.04 K/uL    Lymph # 1.6 1.0 - 4.8 K/uL    Mono # 0.4 0.3 - 1.0 K/uL    Eos # 0.1 0.0 - 0.5 K/uL    Baso # 0.02 0.00 - 0.20 K/uL    nRBC 0 0 /100 WBC    Gran % 80.6 (H) 38.0 - 73.0 %    Lymph % 14.7 (L) 18.0 - 48.0 %    Mono % 3.6 (L) 4.0 -  15.0 %    Eosinophil % 0.5 0.0 - 8.0 %    Basophil % 0.2 0.0 - 1.9 %    Differential Method Automated    Comprehensive Metabolic Panel    Collection Time: 11/09/23  5:19 PM   Result Value Ref Range    Sodium 149 (H) 136 - 145 mmol/L    Potassium 4.4 3.5 - 5.1 mmol/L    Chloride 112 (H) 95 - 110 mmol/L    CO2 26 23 - 29 mmol/L    Glucose 179 (H) 70 - 110 mg/dL    BUN 54 (H) 8 - 23 mg/dL    Creatinine 3.1 (H) 0.5 - 1.4 mg/dL    Calcium 10.0 8.7 - 10.5 mg/dL    Total Protein 7.7 6.0 - 8.4 g/dL    Albumin 3.9 3.5 - 5.2 g/dL    Total Bilirubin 0.4 0.1 - 1.0 mg/dL    Alkaline Phosphatase 71 55 - 135 U/L    AST 23 10 - 40 U/L    ALT 17 10 - 44 U/L    eGFR 15.7 (A) >60 mL/min/1.73 m^2    Anion Gap 11 8 - 16 mmol/L   BNP    Collection Time: 11/09/23  5:19 PM   Result Value Ref Range    BNP 19 0 - 99 pg/mL   Troponin I    Collection Time: 11/09/23  5:19 PM   Result Value Ref Range    Troponin I 0.043 (H) 0.000 - 0.026 ng/mL   TSH    Collection Time: 11/09/23  5:19 PM   Result Value Ref Range    TSH 1.522 0.400 - 4.000 uIU/mL   Ethanol    Collection Time: 11/09/23  5:19 PM   Result Value Ref Range    Alcohol, Serum <10 <10 mg/dL   Lactic Acid, Plasma    Collection Time: 11/09/23  5:19 PM   Result Value Ref Range    Lactate (Lactic Acid) 1.2 0.5 - 2.2 mmol/L       RADIOLOGY  Echo    Result Date: 10/20/2023    Left Ventricle: The left ventricle is normal in size. Mildly increased wall thickness. Normal wall motion. There is normal systolic function with a visually estimated ejection fraction of 60 - 65%. Grade I diastolic dysfunction.   Left Atrium: Left atrium is severely dilated.   Right Ventricle: Normal right ventricular cavity size. Systolic function is normal.   Right Atrium: Right atrium is mildly dilated.   Pulmonary Artery: The estimated pulmonary artery systolic pressure is 35 mmHg.   IVC/SVC: Intermediate venous pressure at 8 mmHg.       EKG    MICROBIOLOGY    MDM     Amount and/or Complexity of Data  Reviewed  Clinical lab tests: reviewed  Tests in the radiology section of CPT®: reviewed  Tests in the medicine section of CPT®: reviewed  Discussion of test results with the performing providers: yes  Decide to obtain previous medical records or to obtain history from someone other than the patient: yes  Obtain history from someone other than the patient: yes  Review and summarize past medical records: yes  Discuss the patient with other providers: yes  Independent visualization of images, tracings, or specimens: yes          Assessment/Plan:     * Syncope  Patient presented following syncope episode resulting in ground-level fall with regain of consciousness approximately 1-2 minutes later. Patient denied endorsing presyncopal symptoms including (nausea, vomiting, auras, palpitations, shortness of breath, chest pain). Diffrential diagnosis include reflux syncope (vasovagal, carotid sinus, micturition, defecation, swallowing, coughing) vs likely secondary to volume depletion from dehydration in setting of  decreased p.o. intake or medications as patient currently on TCA, diuretics,  ACE/ARB, vs cardiac arrhythmias/structural disease.  Plan:  -EKG  -Telemetry            Acute on chronic kidney failure  Patient with acute kidney injury likely d/t IVVD/Dehydration and Pre-renal azotemia. BONITA is currently being treated. Labs reviewed- Renal function/electrolytes with Estimated Creatinine Clearance: 18.3 mL/min (A) (based on SCr of 3.1 mg/dL (H)). according to latest data. Monitor urine output and serial BMP and adjust therapy as needed. Avoid nephrotoxins and renally dose meds for GFR listed above.       Elevated troponin  Likely secondary to ischemic demand from BONITA superimposed on CKD.  Plan:  -IVFs  -serial trops  -tele monitoring  -cards consult if warranted  -repeat ekg if warranted       Dementia with behavioral disturbance  Dementia is uncontrolled currently. Continue home dementia meds and non-pharmacologic  interventions to prevent delirium (No VS between 11PM-5AM, activity during day, opening blinds, providing glasses/hearing aids, and up in chair during daytime). Use PRN anti-psychotics to prevent behavior of self harm during sundowning, and avoid narcotics and benzos unless absolutely necessary. PRN anti-psychotics prescribed to avoid self harm behaviors.        Essential hypertension  Chronic, controlled. Latest blood pressure and vitals reviewed-     Temp:  [98.1 °F (36.7 °C)-99 °F (37.2 °C)]   Pulse:  []   Resp:  [18]   BP: (120-138)/(52-91)   SpO2:  [95 %-100 %] .   Home meds for hypertension were reviewed and noted below.   Hypertension Medications             hydroCHLOROthiazide (HYDRODIURIL) 25 MG tablet Take 25 mg by mouth.    losartan (COZAAR) 50 MG tablet Take 50 mg by mouth once daily.    valsartan (DIOVAN) 160 MG tablet Take 160 mg by mouth once daily.          While in the hospital, will manage blood pressure as follows; Continue home antihypertensive regimen    Will utilize p.r.n. blood pressure medication only if patient's blood pressure greater than 160/100 and she develops symptoms such as worsening chest pain or shortness of breath.    HLD (hyperlipidemia)  Patient is chronically on statin.will continue for now. Last Lipid Panel:   Lab Results   Component Value Date    CHOL 230 (H) 05/26/2023    HDL 51 05/26/2023    LDLCALC 156.2 05/26/2023    TRIG 114 05/26/2023    CHOLHDL 22.2 05/26/2023     Plan:  -Continue home medication  -low fat/low calorie diet        VTE Risk Mitigation (From admission, onward)         Ordered     enoxaparin injection 30 mg  Daily         11/09/23 2143     IP VTE HIGH RISK PATIENT  Once         11/09/23 2143     Place sequential compression device  Until discontinued         11/09/23 2143                 //Core Measures   -DVT proph: SCDs, lovenox  -Code status Full    -Surrogate:none      Components of this note were documented using a voice recognition system and  are subject to errors not corrected at the time the document was proof read. Please contact the author for any clarifications.       Alfredo Shukla NP  Department of Hospital Medicine  [facility]

## 2023-11-10 NOTE — ASSESSMENT & PLAN NOTE
Patient presented following syncope episode resulting in ground-level fall with regain of consciousness approximately 1-2 minutes later. Patient denied endorsing presyncopal symptoms including (nausea, vomiting, auras, palpitations, shortness of breath, chest pain). Diffrential diagnosis include reflux syncope (vasovagal, carotid sinus, micturition, defecation, swallowing, coughing) vs likely secondary to volume depletion from dehydration in setting of  decreased p.o. intake or medications as patient currently on TCA, diuretics,  ACE/ARB, vs cardiac arrhythmias/structural disease.  Plan:  -EKG  -Telemetry

## 2023-11-10 NOTE — PLAN OF CARE
[facility]  Discharge Final Note    Primary Care Provider: Jayesh Dolan MD    Expected Discharge Date: 11/10/2023    Final Discharge Note (most recent)       Final Note - 11/10/23 1320          Final Note    Assessment Type Final Discharge Note     Anticipated Discharge Disposition Rehab Facility     Hospital Resources/Appts/Education Provided Post-Acute resouces added to AVS        Post-Acute Status    Post-Acute Authorization Placement     Post-Acute Placement Status Set-up Complete/Auth obtained     Discharge Delays None known at this time                      Follow-up providers       Jayesh Dolan MD   Specialty: Family Medicine, Hospitalist   Relationship: PCP - General    [address] [place] [address]   Phone: [phone]       Next Steps: Schedule an appointment as soon as possible for a visit in 3 day(s)    Instructions: call office and schedule a hospital follow up in 3-5 days              After-discharge care                Destination       [facility]   Service: Inpatient Rehabilitation    [id number] [place] [address], [address]  [facility] [address]   Phone: [phone]                             Sw spoke with [facility] Nurse who confirmed they can admit pt today. [facility] stated they will arrange ambulance transport to p/u pt.     Sw informed to send a physical d/c packet with pt since their faxes are down; Sw agreed.     No additional d/c needs.

## 2023-11-10 NOTE — SUBJECTIVE & OBJECTIVE
Past Medical History:   Diagnosis Date    CKD (chronic kidney disease)     Dementia     HLD (hyperlipidemia)     Hyperkalemia     Hypernatremia     Hypertension        Past Surgical History:   Procedure Laterality Date    APPENDECTOMY         Review of patient's allergies indicates:  No Known Allergies    No current facility-administered medications on file prior to encounter.     Current Outpatient Medications on File Prior to Encounter   Medication Sig    atorvastatin (LIPITOR) 20 MG tablet     busPIRone (BUSPAR) 10 MG tablet Take 10 mg by mouth 2 (two) times daily as needed.    hydroCHLOROthiazide (HYDRODIURIL) 25 MG tablet Take 25 mg by mouth.    losartan (COZAAR) 50 MG tablet Take 50 mg by mouth once daily.    OLANZapine (ZYPREXA) 2.5 MG tablet Take 2.5 mg by mouth every evening.    traZODone (DESYREL) 50 MG tablet Take 50 mg by mouth every evening.    ziprasidone (GEODON) 20 MG Cap Take 20 mg by mouth 2 (two) times daily.    valsartan (DIOVAN) 160 MG tablet Take 160 mg by mouth once daily.     Family History    None       Tobacco Use    Smoking status: Never    Smokeless tobacco: Never   Substance and Sexual Activity    Alcohol use: Yes     Comment: occasional    Drug use: Never    Sexual activity: Not on file     Review of Systems   Unable to perform ROS: Acuity of condition     Objective:     Vital Signs (Most Recent):  Temp: 99 °F (37.2 °C) (11/09/23 2128)  Pulse: 82 (11/09/23 2141)  Resp: 18 (11/09/23 2128)  BP: (!) 133/53 (11/09/23 2130)  SpO2: 96 % (11/09/23 2128) Vital Signs (24h Range):  Temp:  [98.1 °F (36.7 °C)-99 °F (37.2 °C)] 99 °F (37.2 °C)  Pulse:  [] 82  Resp:  [18] 18  SpO2:  [95 %-100 %] 96 %  BP: (120-138)/(52-91) 133/53     Weight: 87.5 kg (192 lb 14.4 oz)  Body mass index is 33.11 kg/m².     Physical Exam  Vitals and nursing note reviewed.   Constitutional:       General: She is awake. She is not in acute distress.     Appearance: Normal appearance. She is well-developed and  well-groomed. She is not ill-appearing, toxic-appearing or diaphoretic.      Comments: Combative with staff, pulling clothing off, unable to redirect   HENT:      Head: Normocephalic and atraumatic.   Eyes:      Extraocular Movements: Extraocular movements intact.      Conjunctiva/sclera: Conjunctivae normal.   Cardiovascular:      Rate and Rhythm: Normal rate and regular rhythm.      Heart sounds: Normal heart sounds. No murmur heard.  Pulmonary:      Effort: Pulmonary effort is normal.      Breath sounds: Normal breath sounds.   Abdominal:      General: Bowel sounds are normal.      Palpations: Abdomen is soft.      Tenderness: There is no abdominal tenderness.   Musculoskeletal:      Cervical back: Normal range of motion and neck supple.      Comments: 5/5 strength throughout   Skin:     General: Skin is warm and dry.      Capillary Refill: Capillary refill takes less than 2 seconds.   Neurological:      General: No focal deficit present.      Mental Status: She is alert. She is confused.      GCS: GCS eye subscore is 4. GCS verbal subscore is 4. GCS motor subscore is 6.      Motor: Motor function is intact.   Psychiatric:         Behavior: Behavior is uncooperative, agitated and combative.              LABS:  Recent Results (from the past 24 hour(s))   CBC Auto Differential    Collection Time: 11/09/23  5:19 PM   Result Value Ref Range    WBC 11.18 3.90 - 12.70 K/uL    RBC 4.10 4.00 - 5.40 M/uL    Hemoglobin 11.1 (L) 12.0 - 16.0 g/dL    Hematocrit 35.6 (L) 37.0 - 48.5 %    MCV 87 82 - 98 fL    MCH 27.1 27.0 - 31.0 pg    MCHC 31.2 (L) 32.0 - 36.0 g/dL    RDW 14.5 11.5 - 14.5 %    Platelets 168 150 - 450 K/uL    MPV 13.5 (H) 9.2 - 12.9 fL    Immature Granulocytes 0.4 0.0 - 0.5 %    Gran # (ANC) 9.0 (H) 1.8 - 7.7 K/uL    Immature Grans (Abs) 0.04 0.00 - 0.04 K/uL    Lymph # 1.6 1.0 - 4.8 K/uL    Mono # 0.4 0.3 - 1.0 K/uL    Eos # 0.1 0.0 - 0.5 K/uL    Baso # 0.02 0.00 - 0.20 K/uL    nRBC 0 0 /100 WBC    Gran % 80.6  (H) 38.0 - 73.0 %    Lymph % 14.7 (L) 18.0 - 48.0 %    Mono % 3.6 (L) 4.0 - 15.0 %    Eosinophil % 0.5 0.0 - 8.0 %    Basophil % 0.2 0.0 - 1.9 %    Differential Method Automated    Comprehensive Metabolic Panel    Collection Time: 11/09/23  5:19 PM   Result Value Ref Range    Sodium 149 (H) 136 - 145 mmol/L    Potassium 4.4 3.5 - 5.1 mmol/L    Chloride 112 (H) 95 - 110 mmol/L    CO2 26 23 - 29 mmol/L    Glucose 179 (H) 70 - 110 mg/dL    BUN 54 (H) 8 - 23 mg/dL    Creatinine 3.1 (H) 0.5 - 1.4 mg/dL    Calcium 10.0 8.7 - 10.5 mg/dL    Total Protein 7.7 6.0 - 8.4 g/dL    Albumin 3.9 3.5 - 5.2 g/dL    Total Bilirubin 0.4 0.1 - 1.0 mg/dL    Alkaline Phosphatase 71 55 - 135 U/L    AST 23 10 - 40 U/L    ALT 17 10 - 44 U/L    eGFR 15.7 (A) >60 mL/min/1.73 m^2    Anion Gap 11 8 - 16 mmol/L   BNP    Collection Time: 11/09/23  5:19 PM   Result Value Ref Range    BNP 19 0 - 99 pg/mL   Troponin I    Collection Time: 11/09/23  5:19 PM   Result Value Ref Range    Troponin I 0.043 (H) 0.000 - 0.026 ng/mL   TSH    Collection Time: 11/09/23  5:19 PM   Result Value Ref Range    TSH 1.522 0.400 - 4.000 uIU/mL   Ethanol    Collection Time: 11/09/23  5:19 PM   Result Value Ref Range    Alcohol, Serum <10 <10 mg/dL   Lactic Acid, Plasma    Collection Time: 11/09/23  5:19 PM   Result Value Ref Range    Lactate (Lactic Acid) 1.2 0.5 - 2.2 mmol/L       RADIOLOGY  Echo    Result Date: 10/20/2023    Left Ventricle: The left ventricle is normal in size. Mildly increased wall thickness. Normal wall motion. There is normal systolic function with a visually estimated ejection fraction of 60 - 65%. Grade I diastolic dysfunction.   Left Atrium: Left atrium is severely dilated.   Right Ventricle: Normal right ventricular cavity size. Systolic function is normal.   Right Atrium: Right atrium is mildly dilated.   Pulmonary Artery: The estimated pulmonary artery systolic pressure is 35 mmHg.   IVC/SVC: Intermediate venous pressure at 8 mmHg.        EKG    MICROBIOLOGY    MDM     Amount and/or Complexity of Data Reviewed  Clinical lab tests: reviewed  Tests in the radiology section of CPT®: reviewed  Tests in the medicine section of CPT®: reviewed  Discussion of test results with the performing providers: yes  Decide to obtain previous medical records or to obtain history from someone other than the patient: yes  Obtain history from someone other than the patient: yes  Review and summarize past medical records: yes  Discuss the patient with other providers: yes  Independent visualization of images, tracings, or specimens: yes

## 2023-11-10 NOTE — ASSESSMENT & PLAN NOTE
Likely secondary to ischemic demand from BONITA superimposed on CKD.  Plan:  -IVFs  -serial trops  -tele monitoring  -cards consult if warranted  -repeat ekg if warranted

## 2023-11-10 NOTE — ASSESSMENT & PLAN NOTE
Patient with acute kidney injury likely d/t IVVD/Dehydration and Pre-renal azotemia. BONITA is currently being treated. Labs reviewed- Renal function/electrolytes with Estimated Creatinine Clearance: 18.3 mL/min (A) (based on SCr of 3.1 mg/dL (H)). according to latest data. Monitor urine output and serial BMP and adjust therapy as needed. Avoid nephrotoxins and renally dose meds for GFR listed above.

## 2023-11-10 NOTE — HPI
"Tyra Zavala is a 68 y.o. female with a PMH  has a past medical history of CKD (chronic kidney disease), Dementia, HLD (hyperlipidemia), Hyperkalemia, Hypernatremia, and Hypertension.  History limited secondary to patient's change in mental status.  Patient is a transfer from our [facility] facility for further eval for syncopal episode that was witnessed earlier today by nursing home staff.     Per ED providers/triage note:    "Pt transported to er from [place] [facility]. Per NH staff, pt was sitting in day room when she went unresponsive for about one minute. While unresponsive she was bowel incontinent. Pt back at baseline when aasi arrived. Hx of dementia and psych hx".     ER workup remarkable for sodium of 149, BUN/creatinine of 54/3.1, , troponin 0.043.  Lactic acid, TSH, and all other lab work unremarkable.  Last echocardiogram performed on 10/20/2033 revealed normal LVEF.  Patient received 1 L of LR in addition to 2 doses of 2.5 mg of Haldol for agitation/combativeness.  Patient admitted to our hospital under observation status further workup.    PCP: Jayesh Dolan\    "

## 2023-11-10 NOTE — DISCHARGE SUMMARY
"[facility]  Discharge Summary      Patient Name: Tyra Zavala  MRN: [id number]  [facility]: [id number]  Patient Class: OP- Observation  Admission Date: 11/9/2023  Hospital Length of Stay: 0 days  Discharge Date and Time: 11/10/2023  3:27 PM  Attending Physician: Damion Pena MD  Discharging Provider: Amada Flores NP  Primary Care Provider: Jayesh Dolan MD    Primary Care Team: Networked reference to record PCT     HPI:   Tyra Zavala is a 68 y.o. female with a PMH  has a past medical history of CKD (chronic kidney disease), Dementia, HLD (hyperlipidemia), Hyperkalemia, Hypernatremia, and Hypertension.  History limited secondary to patient's change in mental status.  Patient is a transfer from our [facility] facility for further eval for syncopal episode that was witnessed earlier today by nursing home staff.     Per ED providers/triage note:    "[facility] Per [facility] staff, pt was sitting in day room when she went unresponsive for about one minute. While unresponsive she was bowel incontinent. Pt back at baseline when aasi arrived. Hx of dementia and psych hx".     ER workup remarkable for sodium of 149, BUN/creatinine of 54/3.1, , troponin 0.043.  Lactic acid, TSH, and all other lab work unremarkable.  Last echocardiogram performed on 10/20/2033 revealed normal LVEF.  Patient received 1 L of LR in addition to 2 doses of 2.5 mg of Haldol for agitation/combativeness.  Patient admitted to our hospital under observation status further workup.    PCP: Jayesh Dolan\      * No surgery found *      Hospital Course:   67 y/o [facility] having [facility]. Patient presented to [facility] ER and transferred here for further workup. The original report was that she was in day room and became unresponsive ~ 1 minute and had bowel incontinent episode. The DON called the hospital later and said she was having a BM and has a " vasovagal syncopal episode and wanted to know about discharge plan.   Patient is end stage Dementia and was combative on admission requiring 2 doses of Haldol to calm her down. BONITA on admission, trending down after IVF hydration.   Troponin was slightly elevated but flat. EKG SR with short VT, otherwise normal. No ACS suspected.   PT was ambulating around her room unassisted.   She will be discharged back to [place] [facility] to continue her therapy.     Follow up with PCP in 3-5 days for hospital follow up.     Patient seen and examined on the day of discharge.  All questions and concerns were addressed prior to discharge.    Face to face encounter with patient: 35 minutes     Goals of Care Treatment Preferences:  Code Status: Full Code      Consults:   Consults (From admission, onward)          Status Ordering Provider     IP consult to case management  Once        Provider:  (Not yet assigned)    Completed KAE ARRINGTON            Neuro  Dementia with behavioral disturbance  Dementia is uncontrolled currently. Continue home dementia meds and non-pharmacologic interventions to prevent delirium (No VS between 11PM-5AM, activity during day, opening blinds, providing glasses/hearing aids, and up in chair during daytime). Use PRN anti-psychotics to prevent behavior of self harm during sundowning, and avoid narcotics and benzos unless absolutely necessary. PRN anti-psychotics prescribed to avoid self harm behaviors.    Cardiac/Vascular  * Vasovagal syncope  -DON called and said patient had a vasovagal episode and passed out briefly after having a BM  -EKG SR with short VT, otherwise normal  -cont Telemetry      Elevated troponin  -Likely secondary to ischemic demand from BONITA superimposed on CKD  -cont IVFs  -serial trops flat  -tele monitoring    HLD (hyperlipidemia)  Patient is chronically on statin.will continue for now. Last Lipid Panel:   Lab Results   Component Value Date    CHOL 230 (H) 05/26/2023    HDL 51  05/26/2023    LDLCALC 156.2 05/26/2023    TRIG 114 05/26/2023    CHOLHDL 22.2 05/26/2023       Bradycardia  -patients HR drops during sleep, but returned to normal when she is up  -follow up with cardiology OP    Essential hypertension  Chronic, controlled. Latest blood pressure and vitals reviewed-     Temp:  [97.6 °F (36.4 °C)-99 °F (37.2 °C)]   Pulse:  []   Resp:  [18-20]   BP: (123-139)/(53-97)   SpO2:  [95 %-100 %] .   Home meds for hypertension were reviewed and noted below.   Hypertension Medications               hydroCHLOROthiazide (HYDRODIURIL) 25 MG tablet Take 25 mg by mouth.    losartan (COZAAR) 50 MG tablet Take 50 mg by mouth once daily.    valsartan (DIOVAN) 160 MG tablet Take 160 mg by mouth once daily.            While in the hospital, will manage blood pressure as follows; Continue home antihypertensive regimen    Will utilize p.r.n. blood pressure medication only if patient's blood pressure greater than 160/100 and she develops symptoms such as worsening chest pain or shortness of breath.    Renal/  Acute on chronic kidney failure  Patient with acute kidney injury likely d/t IVVD/Dehydration and Pre-renal azotemia. BONITA is currently being treated. Labs reviewed- Renal function/electrolytes with Estimated Creatinine Clearance: 19.6 mL/min (A) (based on SCr of 2.9 mg/dL (H)). according to latest data. Monitor urine output and serial BMP and adjust therapy as needed. Avoid nephrotoxins and renally dose meds for GFR listed above.       Final Active Diagnoses:    Diagnosis Date Noted POA    PRINCIPAL PROBLEM:  Vasovagal syncope [R55] 11/10/2023 Yes    Acute on chronic kidney failure [N17.9, N18.9] 11/10/2023 Yes    Elevated troponin [R79.89] 11/10/2023 Yes    Bradycardia [R00.1] 10/20/2023 Yes    Dementia with behavioral disturbance [F03.918]  Yes    HLD (hyperlipidemia) [E78.5]  Yes     Chronic    Essential hypertension [I10] 09/28/2019 Yes     Chronic      Problems Resolved During this  Admission:       Discharged Condition: good    Disposition: Rehab Facility    Follow Up:   Contact information for follow-up providers       Jayesh Dolan MD. Schedule an appointment as soon as possible for a visit in 3 day(s).    Specialties: Family Medicine, Hospitalist  Why: call office and schedule a hospital follow up in 3-5 days  Contact information:  [address] [place] [address]  [phone]                       Contact information for after-discharge care       Destination       [facility] .    Service: Inpatient Rehabilitation  Contact information:  [address], [address]  [place] [address]  [phone]                                 Patient Instructions:      Diet Cardiac     Activity as tolerated       Significant Diagnostic Studies: Labs: CMP   Recent Labs   Lab 11/09/23  1719 11/10/23  0603   * 149*   K 4.4 4.4   * 114*   CO2 26 20*   * 84   BUN 54* 49*   CREATININE 3.1* 2.9*   CALCIUM 10.0 9.6   PROT 7.7  --    ALBUMIN 3.9  --    BILITOT 0.4  --    ALKPHOS 71  --    AST 23  --    ALT 17  --    ANIONGAP 11 15    and CBC   Recent Labs   Lab 11/09/23  1719   WBC 11.18   HGB 11.1*   HCT 35.6*          Pending Diagnostic Studies:       None           Medications:  Reconciled Home Medications:      Medication List        CONTINUE taking these medications      atorvastatin 20 MG tablet  Commonly known as: LIPITOR     busPIRone 10 MG tablet  Commonly known as: BUSPAR  Take 10 mg by mouth 2 (two) times daily as needed.     hydroCHLOROthiazide 25 MG tablet  Commonly known as: HYDRODIURIL  Take 25 mg by mouth.     losartan 50 MG tablet  Commonly known as: COZAAR  Take 50 mg by mouth once daily.     OLANZapine 2.5 MG tablet  Commonly known as: ZyPREXA  Take 2.5 mg by mouth every evening.     traZODone 50 MG tablet  Commonly known as: DESYREL  Take 50 mg by mouth every evening.     ziprasidone 20 MG Cap  Commonly known as:  GEODON  Take 20 mg by mouth 2 (two) times daily.            STOP taking these medications      valsartan 160 MG tablet  Commonly known as: DIOVAN              Indwelling Lines/Drains at time of discharge:   Lines/Drains/Airways       None                   Time spent on the discharge of patient: 46 minutes         Amada Flores NP  Department of Hospital Medicine  '[facility]

## 2023-11-10 NOTE — HOSPITAL COURSE
69 y/o female presented to ER from [facility] after having a syncopal episode. Patient presented to [facility] ER and transferred here for further workup. The original report was that she was in day room and became unresponsive ~ 1 minute and had bowel incontinent episode. The DON called the hospital later and said she was having a BM and has a vasovagal syncopal episode and wanted to know about discharge plan.   Patient is end stage Dementia and was combative on admission requiring 2 doses of Haldol to calm her down. BONITA on admission, trending down after IVF hydration.   Troponin was slightly elevated but flat. EKG SR with short AK, otherwise normal. No ACS suspected.   PT was ambulating around her room unassisted.   She will be discharged back to [facility] to continue her therapy.     Follow up with PCP in 3-5 days for hospital follow up.     Patient seen and examined on the day of discharge.  All questions and concerns were addressed prior to discharge.    Face to face encounter with patient: 35 minutes

## 2023-11-10 NOTE — PLAN OF CARE
[facility]  Discharge Assessment    Primary Care Provider: Jayesh Dolan MD     Discharge Assessment (most recent)       BRIEF DISCHARGE ASSESSMENT - 11/10/23 1203          Discharge Planning    Assessment Type Discharge Planning Brief Assessment     Resource/Environmental Concerns none     Support Systems Children     Assistance Needed Moderate Assistnce     Equipment Currently Used at Home none     Current Living Arrangements home     Patient/Family Anticipates Transition to inpatient rehabilitation facility     Patient/Family Anticipated Services at Transition none     DME Needed Upon Discharge  none     Discharge Plan A Rehab                   Pt came from [facility] in [place]. Plan to return once medically cleared.

## 2023-11-10 NOTE — ASSESSMENT & PLAN NOTE
Patient is chronically on statin.will continue for now. Last Lipid Panel:   Lab Results   Component Value Date    CHOL 230 (H) 05/26/2023    HDL 51 05/26/2023    LDLCALC 156.2 05/26/2023    TRIG 114 05/26/2023    CHOLHDL 22.2 05/26/2023     Plan:  -Continue home medication  -low fat/low calorie diet

## 2023-11-11 NOTE — ASSESSMENT & PLAN NOTE
-patients HR drops during sleep, but returned to normal when she is up  -follow up with cardiology OP

## 2023-11-11 NOTE — ASSESSMENT & PLAN NOTE
Patient with acute kidney injury likely d/t IVVD/Dehydration and Pre-renal azotemia. BONITA is currently being treated. Labs reviewed- Renal function/electrolytes with Estimated Creatinine Clearance: 19.6 mL/min (A) (based on SCr of 2.9 mg/dL (H)). according to latest data. Monitor urine output and serial BMP and adjust therapy as needed. Avoid nephrotoxins and renally dose meds for GFR listed above.

## 2023-11-11 NOTE — ASSESSMENT & PLAN NOTE
Chronic, controlled. Latest blood pressure and vitals reviewed-     Temp:  [97.6 °F (36.4 °C)-99 °F (37.2 °C)]   Pulse:  []   Resp:  [18-20]   BP: (123-139)/(53-97)   SpO2:  [95 %-100 %] .   Home meds for hypertension were reviewed and noted below.   Hypertension Medications             hydroCHLOROthiazide (HYDRODIURIL) 25 MG tablet Take 25 mg by mouth.    losartan (COZAAR) 50 MG tablet Take 50 mg by mouth once daily.    valsartan (DIOVAN) 160 MG tablet Take 160 mg by mouth once daily.          While in the hospital, will manage blood pressure as follows; Continue home antihypertensive regimen    Will utilize p.r.n. blood pressure medication only if patient's blood pressure greater than 160/100 and she develops symptoms such as worsening chest pain or shortness of breath.

## 2023-11-11 NOTE — ASSESSMENT & PLAN NOTE
-Likely secondary to ischemic demand from BONITA superimposed on CKD  -cont IVFs  -serial trops flat  -tele monitoring

## 2023-11-11 NOTE — ASSESSMENT & PLAN NOTE
Dementia is uncontrolled currently. Continue home dementia meds and non-pharmacologic interventions to prevent delirium (No VS between 11PM-5AM, activity during day, opening blinds, providing glasses/hearing aids, and up in chair during daytime). Use PRN anti-psychotics to prevent behavior of self harm during sundowning, and avoid narcotics and benzos unless absolutely necessary. PRN anti-psychotics prescribed to avoid self harm behaviors.   In order to meet Medicare requirements, the clinical documentation must support the information cited in the admission order.  Please be sure to provide detailed and clear documentation about the following in the admitting note/history and physical:

## 2023-11-11 NOTE — ASSESSMENT & PLAN NOTE
-DON called and said patient had a vasovagal episode and passed out briefly after having a BM  -EKG SR with short VT, otherwise normal  -cont Telemetry

## 2024-02-06 DIAGNOSIS — Z12.11 COLON CANCER SCREENING: ICD-10-CM

## 2024-02-12 PROBLEM — N17.9 ACUTE ON CHRONIC KIDNEY FAILURE: Status: RESOLVED | Noted: 2023-11-10 | Resolved: 2024-02-12

## 2024-02-12 PROBLEM — N18.9 ACUTE ON CHRONIC KIDNEY FAILURE: Status: RESOLVED | Noted: 2023-11-10 | Resolved: 2024-02-12

## 2024-05-31 NOTE — ASSESSMENT & PLAN NOTE
Chronic, controlled. Latest blood pressure and vitals reviewed-     Temp:  [98.1 °F (36.7 °C)-99 °F (37.2 °C)]   Pulse:  []   Resp:  [18]   BP: (120-138)/(52-91)   SpO2:  [95 %-100 %] .   Home meds for hypertension were reviewed and noted below.   Hypertension Medications             hydroCHLOROthiazide (HYDRODIURIL) 25 MG tablet Take 25 mg by mouth.    losartan (COZAAR) 50 MG tablet Take 50 mg by mouth once daily.    valsartan (DIOVAN) 160 MG tablet Take 160 mg by mouth once daily.          While in the hospital, will manage blood pressure as follows; Continue home antihypertensive regimen    Will utilize p.r.n. blood pressure medication only if patient's blood pressure greater than 160/100 and she develops symptoms such as worsening chest pain or shortness of breath.   Spiritual Plan of Care    Pt Name: Cindy Khan  Pt : 1944  Date: May 31, 2024    Visit Type: In person    Referral Source: Other (Comment) (Purposeful Rounding)    Reason for Visit: Spiritual Assessment    Visited With: Patient    Length of Visit: 15 minutes    Requires Follow-up: No    Spiritual Care Consult Needed: Spiritual Care eval completed    Spiritual Care Visit Preference:     Taxonomy:    Intended Effects: Helping someone feel comforted, Establish rapport and connectedness, Demonstrate caring and concern, Promote a sense of peace, Lessen anxiety  Methods: Offer support, Offer spiritual/Holiness support, Offer emotional support  Interventions: Acknowledge current situation, Explain  role, Invite someone to reminisce    Patient Affect at Time of Visit: Alert, Confused, Cooperative, Pleasant, Open to  Visit, Talkative  Patient Assessment: Anxious, Coping   Patient  Intervention: Affirmation    Spiritual Plan of Care: Follow up if requested    Patient Reported Outcome:  Reduced anxiousness    Subjective Narrative Note:    Patient, Cindy Khan, received a spiritual care visit at [facility] during purposeful rounding. Cindy waved  into her room as she was walking by. Companionship and positive socialization provided. Patient invited to reminisce. Cindy denied pain and seemed to enjoy talking about memories from her past. 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 Prayers provided. No other spiritual needs or concerns at this time.  will remain available as needed.